# Patient Record
Sex: MALE | Race: ASIAN | NOT HISPANIC OR LATINO | Employment: FULL TIME | URBAN - METROPOLITAN AREA
[De-identification: names, ages, dates, MRNs, and addresses within clinical notes are randomized per-mention and may not be internally consistent; named-entity substitution may affect disease eponyms.]

---

## 2019-05-05 ENCOUNTER — TRANSFERRED RECORDS (OUTPATIENT)
Dept: HEALTH INFORMATION MANAGEMENT | Facility: CLINIC | Age: 29
End: 2019-05-05

## 2019-05-14 ENCOUNTER — TRANSFERRED RECORDS (OUTPATIENT)
Dept: HEALTH INFORMATION MANAGEMENT | Facility: CLINIC | Age: 29
End: 2019-05-14

## 2019-05-14 LAB
CREAT SERPL-MCNC: 4.49 MG/DL (ref 0.72–1.25)
GFR SERPL CREATININE-BSD FRML MDRD: 16 ML/MIN/1.73M2
GLUCOSE SERPL-MCNC: 85 MG/DL (ref 70–100)
POTASSIUM SERPL-SCNC: 4.6 MMOL/L (ref 3.5–5.1)

## 2019-05-20 LAB
CREAT SERPL-MCNC: 4.92 MG/DL (ref 0.72–1.25)
GFR SERPL CREATININE-BSD FRML MDRD: 15 ML/MIN/1.73M2
GLUCOSE SERPL-MCNC: 83 MG/DL (ref 70–100)
POTASSIUM SERPL-SCNC: 4.9 MMOL/L (ref 3.5–5.1)

## 2019-05-28 LAB
CREAT SERPL-MCNC: 5.19 MG/DL (ref 0.72–1.25)
GFR SERPL CREATININE-BSD FRML MDRD: 14 ML/MIN/1.73M2
GLUCOSE SERPL-MCNC: 121 MG/DL (ref 70–100)
POTASSIUM SERPL-SCNC: 4.6 MMOL/L (ref 3.5–5.1)

## 2019-06-03 LAB
CREAT SERPL-MCNC: 4.89 MG/DL (ref 0.72–1.25)
GFR SERPL CREATININE-BSD FRML MDRD: 15 ML/MIN/1.73M2
GLUCOSE SERPL-MCNC: 91 MG/DL (ref 70–100)
POTASSIUM SERPL-SCNC: 5.4 MMOL/L (ref 3.5–5.1)

## 2019-06-11 ENCOUNTER — TRANSFERRED RECORDS (OUTPATIENT)
Dept: HEALTH INFORMATION MANAGEMENT | Facility: CLINIC | Age: 29
End: 2019-06-11

## 2019-06-11 LAB
CREAT SERPL-MCNC: 4.97 MG/DL (ref 0.72–1.25)
GFR SERPL CREATININE-BSD FRML MDRD: 14 ML/MIN/1.73M2
POTASSIUM SERPL-SCNC: 4.8 MMOL/L (ref 3.5–5.1)

## 2019-06-12 ENCOUNTER — TRANSFERRED RECORDS (OUTPATIENT)
Dept: HEALTH INFORMATION MANAGEMENT | Facility: CLINIC | Age: 29
End: 2019-06-12

## 2019-06-17 ENCOUNTER — REFERRAL (OUTPATIENT)
Dept: TRANSPLANT | Facility: CLINIC | Age: 29
End: 2019-06-17

## 2019-06-17 DIAGNOSIS — I10 HYPERTENSION: ICD-10-CM

## 2019-06-17 DIAGNOSIS — N18.9 CKD (CHRONIC KIDNEY DISEASE): ICD-10-CM

## 2019-06-17 DIAGNOSIS — N18.9 CHRONIC RENAL FAILURE: ICD-10-CM

## 2019-06-17 DIAGNOSIS — Z01.818 PRE-TRANSPLANT EVALUATION FOR KIDNEY TRANSPLANT: ICD-10-CM

## 2019-06-17 DIAGNOSIS — N02.B9 IGA NEPHROPATHY: ICD-10-CM

## 2019-06-17 DIAGNOSIS — I10 ESSENTIAL HYPERTENSION: ICD-10-CM

## 2019-06-17 DIAGNOSIS — Z76.82 ORGAN TRANSPLANT CANDIDATE: ICD-10-CM

## 2019-06-17 DIAGNOSIS — Z87.891 HISTORY OF TOBACCO USE: ICD-10-CM

## 2019-06-17 NOTE — Clinical Note
See smart set. Not on dialysis. Call at 2:30 pm during his break or after 4:30 pm when he is off work if you can.

## 2019-06-17 NOTE — LETTER
7/30/19    Vinnie Jean Baptiste  [address] [place] [id number]      Dear Vinnie,    Thank you for your interest in the Transplant Center at [facility], [facility]. We look forward to being a part of your care team and assisting you through the transplant process.    As we discussed, your transplant coordinator is Evangelina Ritter (Kidney).  You may call your coordinator at any time with questions or concerns call [phone].    Please complete the following.    1. Fill out and return the enclosed forms    Authorization for Electronic Communication    Authorization to Discuss Protected Health Information    Authorization for Release of Protected Health Information    2. Sign up for:    [employer], access to your electronic medical record (see enclosed pamphlet)    [employer], a transplant education website    You can use these tools to learn more about your transplant, communicate with your care team, and track your medical details    Sincerely,  Solid Organ Transplant  [facility], [facility]    cc: Joel Rivera MD

## 2019-07-22 VITALS — BODY MASS INDEX: 25.46 KG/M2 | WEIGHT: 168 LBS | HEIGHT: 68 IN

## 2019-07-22 ASSESSMENT — MIFFLIN-ST. JEOR: SCORE: 1696.54

## 2019-07-22 NOTE — TELEPHONE ENCOUNTER
PCP: Madai Fitzgerald APRN   Referring Provider: Dr. Joel Rivera   Referring Diagnosis: IgA nephropathy confirmed with biopsy      Insurance information: [facility]  Policy duron: Self  Subscriber/policy/ID number: [id number]  Group Number:  [id number]    Is patient in a group home/assisted living? No  Does patient have a guardian? No    Referral intake process completed.  Patient is aware that after financial approval is received, medical records will be requested.   Patient confirmed for a callback from transplant coordinator on 8/13/2019. (within 2 weeks)  Tentative evaluation date NA. (within 4 weeks)    Confirmed coordinator will discuss evaluation process in more detail at the time of their call.   Patient is aware of the need to arrange age appropriate cancer screening, vaccinations, and dental care.  Reminded patient to complete questionnaire, complete medical records release, and review packet prior to evaluation visit .  Assessed patient for special needs (ie--wheelchair, assistance, guardian, and ):  No   Patient instructed to call [phone] with questions.     JOSE R Estrada, LPN   Solid Organ Transplant

## 2019-08-12 NOTE — TELEPHONE ENCOUNTER
Attempted to reach Vinnie and was only able to leave a voice message. Introduced myself and the purpose of my call and asked for a return call. Provided my contact information.

## 2019-08-13 NOTE — TELEPHONE ENCOUNTER
Contacted patient and introduced myself as their Transplant Coordinator, also introduced the role of the Transplant Coordinator in the transplant process.  Explained the purpose of this call including reviewing next steps and answering questions.    Confirmed Referring Provider, Dialysis Center, and Primary Care Physician. Notified patient of the importance of continued communication with referring providers and primary care physicians.    Reviewed components of transplant evaluation process including necessary appointments, tests, and procedures.    Answered questions for patient regarding evaluation, provided my name and contact information and requested they call with any additional questions.    Determined that patient would like additional information regarding transplant by:     Drop Down choices: Mail, Email, MyChart, Phone Call   Encourage MyChart   Notified patients that they will hear from a Transplant  to schedule evaluation.       Reviewed medical records to date in Epic and . CKD with GFR of 16 on 5/14/2019 at [facility]. Has biopsy proven IgA nephropathy done here in 2007. He has not had consistent follow up until recently. He has hematuria, HTN and anemia. Recently had epistaxis and melena. EGD was diagnostic for duodenal ulcer and H pylori. He is finishing up his antibiotics and encouraged to follow up with the breath test to make sure he has cleared the infection. He did receive blood. Recently quit smoking, no etoh and no recreational drugs. BMI 25.54. He lives with his parents and brother. His mother also has IgA nephropathy but has not been transplanted. His brother would like to be his donor. He has never had any surgeries. He is independent with his ADL's. He is due for dental. All records acceptable to proceed with pre kidney transplant evaluation.    We talked about the evaluation day and he will arrive by 0700. He was instructed to eat breakfast and take his morning medications  before arrival. We talked about the online group presentation of MTP and he was encouraged to bring someone with him. Reviewed the list of providers he will see and their roles. Reviewed the goals of an evaluation and the approval process. He is aware his next contact will be from scheduling. Provided him with my contact information and encouraged him to call me with any questions.    Smart set orders placed in [employer] and routed to scheduling.

## 2019-09-11 ENCOUNTER — ANCILLARY PROCEDURE (OUTPATIENT)
Dept: GENERAL RADIOLOGY | Facility: CLINIC | Age: 29
End: 2019-09-11
Attending: PHYSICIAN ASSISTANT
Payer: COMMERCIAL

## 2019-09-11 ENCOUNTER — OFFICE VISIT (OUTPATIENT)
Dept: TRANSPLANT | Facility: CLINIC | Age: 29
End: 2019-09-11
Attending: PHYSICIAN ASSISTANT
Payer: COMMERCIAL

## 2019-09-11 ENCOUNTER — DOCUMENTATION ONLY (OUTPATIENT)
Dept: TRANSPLANT | Facility: CLINIC | Age: 29
End: 2019-09-11

## 2019-09-11 ENCOUNTER — ANCILLARY PROCEDURE (OUTPATIENT)
Dept: CARDIOLOGY | Facility: CLINIC | Age: 29
End: 2019-09-11
Attending: PHYSICIAN ASSISTANT
Payer: COMMERCIAL

## 2019-09-11 VITALS
HEART RATE: 88 BPM | HEIGHT: 68 IN | DIASTOLIC BLOOD PRESSURE: 99 MMHG | WEIGHT: 171.8 LBS | TEMPERATURE: 97.9 F | OXYGEN SATURATION: 99 % | BODY MASS INDEX: 26.04 KG/M2 | SYSTOLIC BLOOD PRESSURE: 175 MMHG

## 2019-09-11 DIAGNOSIS — Z76.82 ORGAN TRANSPLANT CANDIDATE: ICD-10-CM

## 2019-09-11 DIAGNOSIS — Z01.818 PRE-TRANSPLANT EVALUATION FOR KIDNEY TRANSPLANT: ICD-10-CM

## 2019-09-11 DIAGNOSIS — N18.9 CKD (CHRONIC KIDNEY DISEASE): ICD-10-CM

## 2019-09-11 DIAGNOSIS — Z87.891 HISTORY OF TOBACCO USE: ICD-10-CM

## 2019-09-11 DIAGNOSIS — I10 ESSENTIAL HYPERTENSION: ICD-10-CM

## 2019-09-11 DIAGNOSIS — N02.B9 IGA NEPHROPATHY: ICD-10-CM

## 2019-09-11 DIAGNOSIS — I10 HYPERTENSION: ICD-10-CM

## 2019-09-11 DIAGNOSIS — N18.6 ESRD (END STAGE RENAL DISEASE) (H): Primary | ICD-10-CM

## 2019-09-11 DIAGNOSIS — Z76.82 ORGAN TRANSPLANT CANDIDATE: Primary | ICD-10-CM

## 2019-09-11 DIAGNOSIS — N18.5 CKD (CHRONIC KIDNEY DISEASE) STAGE 5, GFR LESS THAN 15 ML/MIN (H): ICD-10-CM

## 2019-09-11 DIAGNOSIS — N18.9 CHRONIC RENAL FAILURE: ICD-10-CM

## 2019-09-11 LAB
ABO + RH BLD: NORMAL
ABO + RH BLD: NORMAL
ALBUMIN SERPL-MCNC: 3.8 G/DL (ref 3.4–5)
ALBUMIN UR-MCNC: >499 MG/DL
ALP SERPL-CCNC: 123 U/L (ref 40–150)
ALT SERPL W P-5'-P-CCNC: 30 U/L (ref 0–70)
ANION GAP SERPL CALCULATED.3IONS-SCNC: 11 MMOL/L (ref 3–14)
APPEARANCE UR: CLEAR
APTT PPP: 28 SEC (ref 22–37)
AST SERPL W P-5'-P-CCNC: 7 U/L (ref 0–45)
BASOPHILS # BLD AUTO: 0.1 10E9/L (ref 0–0.2)
BASOPHILS NFR BLD AUTO: 0.6 %
BILIRUB SERPL-MCNC: 0.3 MG/DL (ref 0.2–1.3)
BILIRUB UR QL STRIP: NEGATIVE
BLOOD BANK CMNT PATIENT-IMP: NORMAL
BLOOD BANK CMNT PATIENT-IMP: NORMAL
BUN SERPL-MCNC: 78 MG/DL (ref 7–30)
CALCIUM SERPL-MCNC: 9.2 MG/DL (ref 8.5–10.1)
CHLORIDE SERPL-SCNC: 105 MMOL/L (ref 94–109)
CO2 SERPL-SCNC: 20 MMOL/L (ref 20–32)
COLOR UR AUTO: ABNORMAL
CREAT SERPL-MCNC: 4.79 MG/DL (ref 0.66–1.25)
DIFFERENTIAL METHOD BLD: ABNORMAL
EOSINOPHIL # BLD AUTO: 0 10E9/L (ref 0–0.7)
EOSINOPHIL NFR BLD AUTO: 0 %
ERYTHROCYTE [DISTWIDTH] IN BLOOD BY AUTOMATED COUNT: 13.7 % (ref 10–15)
GFR SERPL CREATININE-BSD FRML MDRD: 15 ML/MIN/{1.73_M2}
GLUCOSE SERPL-MCNC: 116 MG/DL (ref 70–99)
GLUCOSE UR STRIP-MCNC: 50 MG/DL
HCT VFR BLD AUTO: 34.7 % (ref 40–53)
HGB BLD-MCNC: 11.6 G/DL (ref 13.3–17.7)
HGB UR QL STRIP: ABNORMAL
IMM GRANULOCYTES # BLD: 0.1 10E9/L (ref 0–0.4)
IMM GRANULOCYTES NFR BLD: 1.2 %
INR PPP: 1.07 (ref 0.86–1.14)
KETONES UR STRIP-MCNC: NEGATIVE MG/DL
LEUKOCYTE ESTERASE UR QL STRIP: NEGATIVE
LYMPHOCYTES # BLD AUTO: 1.5 10E9/L (ref 0.8–5.3)
LYMPHOCYTES NFR BLD AUTO: 13.3 %
MCH RBC QN AUTO: 28.6 PG (ref 26.5–33)
MCHC RBC AUTO-ENTMCNC: 33.4 G/DL (ref 31.5–36.5)
MCV RBC AUTO: 86 FL (ref 78–100)
MONOCYTES # BLD AUTO: 0.5 10E9/L (ref 0–1.3)
MONOCYTES NFR BLD AUTO: 4.5 %
MUCOUS THREADS #/AREA URNS LPF: PRESENT /LPF
NEUTROPHILS # BLD AUTO: 9.2 10E9/L (ref 1.6–8.3)
NEUTROPHILS NFR BLD AUTO: 80.4 %
NITRATE UR QL: NEGATIVE
NRBC # BLD AUTO: 0 10*3/UL
NRBC BLD AUTO-RTO: 0 /100
PH UR STRIP: 6 PH (ref 5–7)
PLATELET # BLD AUTO: 223 10E9/L (ref 150–450)
POTASSIUM SERPL-SCNC: 4.3 MMOL/L (ref 3.4–5.3)
PROT SERPL-MCNC: 8.5 G/DL (ref 6.8–8.8)
RBC # BLD AUTO: 4.06 10E12/L (ref 4.4–5.9)
RBC #/AREA URNS AUTO: 7 /HPF (ref 0–2)
SODIUM SERPL-SCNC: 136 MMOL/L (ref 133–144)
SOURCE: ABNORMAL
SP GR UR STRIP: 1.01 (ref 1–1.03)
SPECIMEN EXP DATE BLD: NORMAL
UROBILINOGEN UR STRIP-MCNC: 0 MG/DL (ref 0–2)
WBC # BLD AUTO: 11.5 10E9/L (ref 4–11)
WBC #/AREA URNS AUTO: <1 /HPF (ref 0–5)

## 2019-09-11 PROCEDURE — G0463 HOSPITAL OUTPT CLINIC VISIT: HCPCS | Mod: ZF

## 2019-09-11 PROCEDURE — 85730 THROMBOPLASTIN TIME PARTIAL: CPT | Performed by: PHYSICIAN ASSISTANT

## 2019-09-11 PROCEDURE — 86901 BLOOD TYPING SEROLOGIC RH(D): CPT | Performed by: PHYSICIAN ASSISTANT

## 2019-09-11 PROCEDURE — 86850 RBC ANTIBODY SCREEN: CPT | Performed by: PHYSICIAN ASSISTANT

## 2019-09-11 PROCEDURE — 86706 HEP B SURFACE ANTIBODY: CPT | Performed by: PHYSICIAN ASSISTANT

## 2019-09-11 PROCEDURE — 86905 BLOOD TYPING RBC ANTIGENS: CPT | Performed by: PHYSICIAN ASSISTANT

## 2019-09-11 PROCEDURE — 86886 COOMBS TEST INDIRECT TITER: CPT | Performed by: PHYSICIAN ASSISTANT

## 2019-09-11 PROCEDURE — 86870 RBC ANTIBODY IDENTIFICATION: CPT | Performed by: PHYSICIAN ASSISTANT

## 2019-09-11 PROCEDURE — 85670 THROMBIN TIME PLASMA: CPT | Performed by: PHYSICIAN ASSISTANT

## 2019-09-11 PROCEDURE — 86147 CARDIOLIPIN ANTIBODY EA IG: CPT | Performed by: PHYSICIAN ASSISTANT

## 2019-09-11 PROCEDURE — 85610 PROTHROMBIN TIME: CPT | Performed by: PHYSICIAN ASSISTANT

## 2019-09-11 PROCEDURE — 81241 F5 GENE: CPT | Performed by: PHYSICIAN ASSISTANT

## 2019-09-11 PROCEDURE — 86665 EPSTEIN-BARR CAPSID VCA: CPT | Performed by: PHYSICIAN ASSISTANT

## 2019-09-11 PROCEDURE — 86803 HEPATITIS C AB TEST: CPT | Performed by: PHYSICIAN ASSISTANT

## 2019-09-11 PROCEDURE — 85025 COMPLETE CBC W/AUTO DIFF WBC: CPT | Performed by: PHYSICIAN ASSISTANT

## 2019-09-11 PROCEDURE — 86780 TREPONEMA PALLIDUM: CPT | Performed by: PHYSICIAN ASSISTANT

## 2019-09-11 PROCEDURE — 80053 COMPREHEN METABOLIC PANEL: CPT | Performed by: PHYSICIAN ASSISTANT

## 2019-09-11 PROCEDURE — 86900 BLOOD TYPING SEROLOGIC ABO: CPT | Performed by: PHYSICIAN ASSISTANT

## 2019-09-11 PROCEDURE — 81001 URINALYSIS AUTO W/SCOPE: CPT | Performed by: PHYSICIAN ASSISTANT

## 2019-09-11 PROCEDURE — 86644 CMV ANTIBODY: CPT | Performed by: PHYSICIAN ASSISTANT

## 2019-09-11 PROCEDURE — 36415 COLL VENOUS BLD VENIPUNCTURE: CPT | Performed by: PHYSICIAN ASSISTANT

## 2019-09-11 PROCEDURE — 86704 HEP B CORE ANTIBODY TOTAL: CPT | Performed by: PHYSICIAN ASSISTANT

## 2019-09-11 PROCEDURE — 85613 RUSSELL VIPER VENOM DILUTED: CPT | Performed by: PHYSICIAN ASSISTANT

## 2019-09-11 PROCEDURE — 81240 F2 GENE: CPT | Performed by: PHYSICIAN ASSISTANT

## 2019-09-11 PROCEDURE — 86787 VARICELLA-ZOSTER ANTIBODY: CPT | Performed by: PHYSICIAN ASSISTANT

## 2019-09-11 PROCEDURE — 87340 HEPATITIS B SURFACE AG IA: CPT | Performed by: PHYSICIAN ASSISTANT

## 2019-09-11 PROCEDURE — 86481 TB AG RESPONSE T-CELL SUSP: CPT | Performed by: PHYSICIAN ASSISTANT

## 2019-09-11 PROCEDURE — 40000866 ZZHCL STATISTIC HIV 1/2 ANTIGEN/ANTIBODY PRETRANSPLANT ONLY: Performed by: PHYSICIAN ASSISTANT

## 2019-09-11 RX ORDER — CALCITRIOL 0.25 UG/1
CAPSULE, LIQUID FILLED ORAL
COMMUNITY
Start: 2019-08-31

## 2019-09-11 RX ORDER — FERROUS GLUCONATE 324(38)MG
TABLET ORAL
COMMUNITY
Start: 2019-08-02

## 2019-09-11 RX ORDER — ACETAMINOPHEN 325 MG/1
650 TABLET ORAL
COMMUNITY
Start: 2019-05-08

## 2019-09-11 RX ORDER — AMLODIPINE BESYLATE 5 MG/1
5 TABLET ORAL 2 TIMES DAILY
COMMUNITY
Start: 2019-08-31

## 2019-09-11 RX ORDER — METOPROLOL TARTRATE 25 MG/1
TABLET, FILM COATED ORAL
COMMUNITY
Start: 2019-08-31 | End: 2024-09-17

## 2019-09-11 RX ORDER — SODIUM BICARBONATE 650 MG/1
TABLET ORAL
COMMUNITY
Start: 2019-08-31

## 2019-09-11 RX ORDER — FUROSEMIDE 20 MG
TABLET ORAL
Refills: 11 | COMMUNITY
Start: 2019-08-19 | End: 2024-05-16

## 2019-09-11 ASSESSMENT — PAIN SCALES - GENERAL: PAINLEVEL: NO PAIN (0)

## 2019-09-11 ASSESSMENT — MIFFLIN-ST. JEOR: SCORE: 1713.78

## 2019-09-11 NOTE — PROGRESS NOTES
Outpatient MNT: Kidney Transplant Evaluation    Current BMI: 26.1 (HT 68 in,  lbs/78 kg)  BMI is within criteria of <35 for kidney transplant     Time Spent: 15 minutes  Visit Type: Initial  Referring Physician: Meghann  Pt accompanied by: self    Medical dx associated with RD referral  - IgA, CKD IV    History of previous txp: none   Dialysis: no     Nutrition Assessment  Pt cooks for self and follows a low sodium, low K diet.     Appetite: good/baseline     Vitamins, Supplements, Pertinent Meds: vit D, iron   Herbal Medicines/Supplements: none     Diet Recall  Breakfast Muffin or apple    Lunch Deli meat or PB s/w    Dinner Meatloaf, other meats with pasta or white rice    Snacks None    Beverages 30 oz Powerade/day, water, a few orange soda/day, sweet tea 3x/week    Alcohol None    Dining out 1x/week      Physical Activity  Goes to the gym 2x/week      Anthropometrics  Height:   68 in   BMI:    26.1    Weight Status:Overweight BMI 25-29.9   Weight:  171 lbs            IBW (lb): 154  % IBW: 111    Wt Hx: Stable weight with + SATINDER (mild) per pt.     Adj/dosing BW: 171 lbs/78 kg       Labs  No results found for: A1C  Potassium   Date Value Ref Range Status   06/11/2019 4.8 3.5 - 5.1 mmol/L Final   4.6 on 7/30     PHOSPHORUS: 4.7 on 7/22    Malnutrition  % Intake: No decreased intake noted  % Weight Loss: None noted  Subcutaneous Fat Loss: None  Muscle Loss: None  Fluid Accumulation/Edema: Mild   Malnutrition Diagnosis: Patient does not meet two of the above criteria necessary for diagnosing malnutrition     Estimated Nutrition Needs  Energy  0267-3422     (25-30 kcal/kg for maintenance)     Protein  47-62    (0.6-0.8 g/kg for CKD)           Fluid  1 ml/kcal or per MD   Micronutrient   Na+: <2000 mg/day  K+: 9117-9047 mg/day  Phos: 800-1000 mg/day            Nutrition Diagnosis  Food and nutrition related knowledge deficit r/t pre kidney transplant eval AEB pt verbalized not hearing pre/post transplant diet  guidelines.    Nutrition Intervention  Nutrition education provided:  Discussed sodium intake (low sodium foods and drinks, seasoning food without salt and tips for low sodium diet). Encouraged pt to reduce Powerade intake, as 16 oz bottle during visit = 250 mg sodium. Also can ask for lower sodium deli meat.     Reviewed post txp diet guidelines in brief (will review in further detail post txp):  (1) Review of proper food safety measures d/t immunosuppressant therapy post-op and increased risk for food-borne illness    (2) Avoid the following post txp d/t risk for rejection, unknown effects on the organs, and/or potential interactions with immunosuppressants:  - Herbal, Chinese, holistic, chiropractic, natural, alternative medicines and supplements  - Detoxes and cleanses  - Weight loss pills  - Protein powders or other products with extracts or herbs (ie green tea extract)    (3) Med regimen and possible side effects    Patient Understanding: Pt verbalized understanding of education provided.  Expected Compliance: Good  Follow-Up Plans: PRN     Nutrition Goals  1. Limit Na+ <2000mg/day  2. Pt to verbalize understanding of 3 aspects of post txp education provided    Provided pt with contact info.   Deborah Sosa RD, LD  Pgr [phone]

## 2019-09-11 NOTE — LETTER
2019       RE: Vinnie Jean Baptiste  [address] [place] [id number]     Dear Colleague,    Thank you for referring your patient, Vinnie Jean Baptiste, to the [facility] SOLID ORGAN TRANSPLANT at [facility]. Please see a copy of my visit note below.    RE: Vinnie Jean Baptiste    [facility]# [id number]        I saw your patient, Vinnie Jean Baptiste, in consultation in our pretransplant clinic.  He was at clinic to discuss care for his end-stage renal disease.  Prior to clinic, he attended our pretransplant class.       We talked about the pros and cons of transplantation vs. dialysis.  We discussed the fact that it was important that he think about the pros and cons of each treatment option and make an active decision.  We also discussed the fact that the two were interconnected and he may need to go on dialysis before transplant (if he chose to have a transplant) and that if the transplant failed, he might need dialysis before another transplant.       We also discussed the fact that if he chose to have a transplant, he would need to decide between going on the wait list for a  donor transplant vs. having a living donor transplant.  We talked about the pros and cons of each option.  Although I didn't express an opinion regarding transplantation or dialysis, I suggested that if he chose to have a transplant, a living donor transplant would be preferable in that the surgery is the same, the immunosuppressive drugs and the risks are the same, but the transplant could be done sooner and the results are better.  I told him that the wait for  donor kidney was approximately five years for patients who are newly put on the waiting list.  In addition, we talked about the fact that the disadvantage of a living donor transplant was the risk to the donor.       I attempted to answer any remaining questions.  I also told him that should he have any questions, he should feel free to contact us.  We would be  glad to answer any questions either over the phone or at another clinic visit.  His transplant coordinator is Montse Ritter and may be reached at [phone].  Thank you for the opportunity to see him.     I spent 25 minutes with this patient.  Over 90% of that time was spent in counseling and coordination of care.             Yours truly,               Alex Zavaleta MD         Professor of Surgery         ([phone])    AJM/st    Again, thank you for allowing me to participate in the care of your patient.      Sincerely,    GEORGE

## 2019-09-11 NOTE — PROGRESS NOTES
Assessment and Plan:  # Kidney Transplant Evaluation: Patient is an excellent candidate overall. Benefits of a living donor transplant were discussed.    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 He is not on dialysis and has no uremic symptoms, but is nearing the need for RRT, and would likely benefit from a kidney transplant, preferably a preemptive living donor kidney transplant.       # Cardiac Risk: he has no known history of cardiac disease or events and is asymptomatic with exertion. EKG and ECHO pending.     # Mildly elevated WBC with neutrophilia: he is asymptomatic, vitals stable, CXR negative. Will follow up UA. Recommend repeat CBC with PCP in a few weeks or sooner if symptmos develop.    # Health Maintenance: Dental: Up to date    Discussed the risks and benefits of a transplant, including the risk of surgery and immunosuppression medications.  Patient's overall evaluation will be discussed in the Transplant Program's regular meeting with a final recommendation on the patients suitability for transplant to be made at that time.  Patient was seen in conjunction with Dr. Erick Mcgrath as part of a shared visit.    Evaluation:  Vinnie Jean Baptiste was seen in consultation at the request of Dr. Alex Zavaleta for evaluation as a potential kidney transplant recipient.    Reason for Visit:  Vinnie Jean Baptiste is a 29-year-old male with CKD from IgA nephropathy, who presents for kidney transplant evaluation.    History of Present Illness:         Kidney Disease Hx: Had an episode of gross hematuria in 2012 and was referred to nephrology. Also found to have proteinuria, but normal serum creatinine. He had a native kidney biopsy in January 2013 showing IgA nephropathy (full report not currently available). A serologic work up at that time was negative. Mother also with history of IgA nephropathy, but has not required transplant. He was lost to follow up for a few  years and re-presented in May 2019 during an admission for upper GI bleed (see below). Creatinine had been 1.1 mg/dl in 2014 and was 4.2 mg/dl on admission in May. Since then, his creatinine has been around 4.7-5.0 mg/dl with an eGFR around 15 ml/in.       Kidney Disease Dx: IgA nephropathy       Biopsy Proven: Yes         On Dialysis: No       Primary Nephrologist: Dr. Rivera       H/o Kidney Stones: No       H/o Recurrent/Frequent UTI: No         Cardiac/Vascular Disease Risk Factors:        Cardiac Risk Factors: Hypertension and CKD       Known CAD: No       Known PAD/Caludication Symptoms: No       Known Heart Failure: No       Arrhythmia: No       Pulmonary Hypertension: No       Valvular Disease: No       Other: None         Functional Capacity/Frailty:        He does cabinetry in a warehouse for work, which is where he gets most of his physical activity. He denies chest pain, SOB, or claudication symptoms with exertion.       Fatigue/Decreased Energy: [x] No [] Yes    Chest Pain or SOB with Exertion: [x] No [] Yes    Significant Weight Change: [x] No [] Yes    Nausea, Vomiting or Diarrhea: [x] No [] Yes    Fever, Sweats or Chills:  [x] No [] Yes    Leg Swelling [x] No [] Yes        History of Cancer: None    Other Significant Medical Issues:   - Former tobacco use: minimal, < 5 pack years.   - Upper GI bleed 2/2 duodenal ulcer and H. Pylori May 2019. Required pRBC x 3 for acute blood loss anemia.     Review of Systems:  A comprehensive review of systems was obtained and negative, except as noted in the HPI or PMH.    Past Medical History:   Medical record was reviewed and PMH was discussed with patient and noted below.  Past Medical History:   Diagnosis Date     Anemia in chronic kidney disease (CKD)      CKD (chronic kidney disease) stage 5, GFR less than 15 ml/min (H)      Duodenal ulcer      H. pylori infection      Hematuria      History of blood transfusion      Hypertension      IgA nephropathy         Past Social History:   Past Surgical History:   Procedure Laterality Date     BIOPSY      Kidney Biopsy [facility] >10 Years Ago      Personal history of bleeding or anesthesia problems: No    Family History:  Family History   Problem Relation Age of Onset     Kidney Disease Mother        Personal History:   Social History     Socioeconomic History     Marital status:      Spouse name: Not on file     Number of children: Not on file     Years of education: Not on file     Highest education level: Not on file   Occupational History     Not on file   Social Needs     Financial resource strain: Not on file     Food insecurity:     Worry: Not on file     Inability: Not on file     Transportation needs:     Medical: Not on file     Non-medical: Not on file   Tobacco Use     Smoking status: Former Smoker     Types: Cigarettes     Smokeless tobacco: Never Used     Tobacco comment: Quit Smoking 5/5/2019   Substance and Sexual Activity     Alcohol use: Not Currently     Drug use: Not Currently     Sexual activity: Not on file   Lifestyle     Physical activity:     Days per week: Not on file     Minutes per session: Not on file     Stress: Not on file   Relationships     Social connections:     Talks on phone: Not on file     Gets together: Not on file     Attends Alevism service: Not on file     Active member of club or organization: Not on file     Attends meetings of clubs or organizations: Not on file     Relationship status: Not on file     Intimate partner violence:     Fear of current or ex partner: Not on file     Emotionally abused: Not on file     Physically abused: Not on file     Forced sexual activity: Not on file   Other Topics Concern     Parent/sibling w/ CABG, MI or angioplasty before 65F 55M? Not Asked   Social History Narrative     Not on file       Allergies:  No Known Allergies    Medications:  Current Outpatient Medications   Medication Sig     acetaminophen (TYLENOL) 325 MG tablet  "Take 650 mg by mouth     amLODIPine (NORVASC) 5 MG tablet      calcitRIOL (ROCALTROL) 0.25 MCG capsule      Cholecalciferol (VITAMIN D3) 1000 units CAPS Take 2,000 Units by mouth     ferrous gluconate (FERGON) 324 (38 Fe) MG tablet TAKE 1 TABLET BY MOUTH TWICE DAILY WITH FOOD     furosemide (LASIX) 20 MG tablet      metoprolol tartrate (LOPRESSOR) 25 MG tablet      sodium bicarbonate 650 MG tablet      No current facility-administered medications for this visit.        Vitals:  BP (!) 175/99   Pulse 88   Temp 97.9  F (36.6  C) (Oral)   Ht 1.727 m (5' 8\")   Wt 77.9 kg (171 lb 12.8 oz)   SpO2 99%   BMI 26.12 kg/m      Exam:  GENERAL APPEARANCE: alert and no distress  HENT: mouth without ulcers or lesions. Good dentition  LYMPHATICS: no cervical or supraclavicular nodes  RESP: lungs clear to auscultation - no rales, rhonchi or wheezes  CV: regular rhythm, normal rate, no rub, no murmur  EDEMA: no LE edema bilaterally  ABDOMEN: soft, nondistended, nontender  MS: extremities normal - no gross deformities noted, no evidence of inflammation in joints, no muscle tenderness  SKIN: no rash  Femoral pulses 2+ equal bilaterally   DIALYSIS ACCESS:  None    Results:   Recent Results (from the past 336 hour(s))   EKG 12-lead, tracing only [EKG1]    Collection Time: 09/11/19 11:18 AM   Result Value Ref Range    Interpretation ECG Click View Image link to view waveform and result    Factor 2 and 5 mutation analysis    Collection Time: 09/11/19 12:19 PM   Result Value Ref Range    Copath Report       Patient Name: MIRIAM HARRIS  MR#: [id number]  Specimen #: [id number]  Collected: 9/11/2019 12:19  Received: 9/11/2019 17:08  Reported: 9/16/2019 16:08  Ordering Phy(s): KIRBY PONCE    For improved result formatting, select 'View Enhanced Report Format' under   Linked Documents section.  _________________________________________    TEST(S) REQUESTED:  Factor 5 Leiden and Factor 2 by PCR    SPECIMEN " DESCRIPTION:  Blood    METHODOLOGY:   The regions of genomic DNA containing the U9784H Factor V   Leiden gene mutation (Factor V  Leiden) and the Factor 2(Prothrombin Q49881Q) gene mutation were   simultaneously amplified using the polymerase  chain reaction.  The amplified products were digested with restriction   endonuclease TaqI and products were  analyzed by gel electrophoresis.    RESULTS:    Factor V 1691G>A (Leiden)  RESULTS:  Mutation analyzed:     1691G>A  Factor V 1691G>A (Leiden)  Interpretation:      ABSENT  Factor V 1691G>A (Leiden) mutation  genotype:      G/G    FACTOR 2/ PROTHROMBIN RESULTS:  Mutation analyzed:     35798Y>A  Factor 2 Mutation Interpretation:      ABSENT  Factor 2 Mutation genotype:      G/G    INTERPRETATION:  The patient is negative for the Factor V 1691G>A (Leiden) and negative for   the Factor 2 mutation.    COMMENTS:  If a patient is the recipient of an allogeneic bone marrow transplant,   this test must be done on a  pre-transplant sample or buccal swab.  A previous allogeneic bone marrow   transplant will interfere with test  results.  Call the [facility]([phone]) for   instructions on sample collection for these  patients.    This test was developed and its performance characteristics determined by   the [facility],  [facility]. It has not been cleared or   approved by the FDA. The laboratory is  regulated under CLIA as qualified to perform high-complexity testing. This   test is used for clinical purposes.  It should not be regarded as investigational or for  research.    A resident/fellow in an accredited training program was involved in the   selection of testing, review of  laboratory data, and/or interpretation of this case.  I, as the senior   physician, attest that I: (i) confirmed  appropriate testing, (ii) examined the relevant raw data for the   specimen(s); and (iii) rendered or confirmed  the  interpretation(s).    Electronically Signed Out By:  Paulina Moore M.D. [facility]    CPT Codes:  A: 16390-W9FYTD, 14914-F6YXLH, -PQNEKC(2)    TESTING LAB LOCATION:  [address]  [address] [place] [id number] [phone]    COLLECTION SITE:  Client:  [facility]  Location:  [facility] (B)     HLA Typing Complete SOT Recipient    Collection Time: 09/11/19 12:51 PM   Result Value Ref Range    ABTest Method SSOP     A* locus A*11     A* A*26     B* locus B*44     B* B*54     C* locus C*01     C* C*05     Bw-1 Bw*4     Bw-2 Bw*6     Drsso Test Method SSOP     DRB1* locus DRB1*04     DRB1* DRB1*13     DRB3* locus DRB3*01     DRB4* DRB4*01     DQB1* locus DQB1*04     DQB1* DQB1*06     DQA1*locus DQA1*01     DQA1* DQA1*03     DPB1* DPB1*03:01     DPB1* NMDP BRVPN     DPB1*locus DPB1*04:02     DPB1* locus NMDP BPPGC     DPA1* DPA1*01:03     DPA1* NMDP BPGMP    PRA Single Antigen IgG Antibody    Collection Time: 09/11/19 12:51 PM   Result Value Ref Range    SA1 Test Method SA FCS     SA1 Cell Class I     SA1 Hi Risk Mary B:8 60     SA1 Mod Risk Mary B:7 13 27 48 59 61 81 Cw:17     SA1 Comments        Test performed by modified procedure. Serum heat inactivated and tested   by a modified ([place]) protocol including fetal calf serum addition.   High-risk, mfi >3,000. Mod-risk, mfi 500-3,000.      SA2 Test Method SA FCS     SA2 Cell Class II     SA2 Hi Risk Mary DQ:2     SA2 Mod Risk Mary DP:19     SA2 Comments        Test performed by modified procedure. Serum heat inactivated and tested   by a modified ([place]) protocol including fetal calf serum addition.   High-risk, mfi >3,000. Mod-risk, mfi 500-3,000.      Protocol Cutoff Plan A, 500 mfi cumulative      UNOS cPRA 70     Unacceptable Antigen B:7 8 13 27 48 59 60 61 81 DQ:2    CMV Antibody IgG [KHG3550]    Collection Time: 09/11/19 12:51 PM   Result Value Ref Range    CMV  Antibody IgG <0.2 0.0 - 0.8 AI   EBV Capsid Antibody IgG [[id number]]    Collection Time: 09/11/19 12:51 PM   Result Value Ref Range    EBV Capsid Antibody IgG 3.7 (H) 0.0 - 0.8 AI   Hepatitis B core antibody [[id number]]    Collection Time: 09/11/19 12:51 PM   Result Value Ref Range    Hepatitis B Core Mary Nonreactive NR^Nonreactive   Hepatitis B Surface Antibody [[id number]]    Collection Time: 09/11/19 12:51 PM   Result Value Ref Range    Hepatitis B Surface Antibody 63.21 (H) <8.00 m[IU]/mL   Hepatitis B surface antigen [[id number]]    Collection Time: 09/11/19 12:51 PM   Result Value Ref Range    Hep B Surface Agn Nonreactive NR^Nonreactive   Hepatitis C antibody [GPZ301]    Collection Time: 09/11/19 12:51 PM   Result Value Ref Range    Hepatitis C Antibody Nonreactive NR^Nonreactive   HIV Antigen Antibody Combo Pretransplant    Collection Time: 09/11/19 12:51 PM   Result Value Ref Range    HIV Antigen Antibody Combo Pretransplant Nonreactive NR^Nonreactive   Varicella Zoster Virus Antibody IgG [[id number]]    Collection Time: 09/11/19 12:51 PM   Result Value Ref Range    Varicella Zoster Virus Antibody IgG 0.6 0.0 - 0.8 AI   Treponema Abs w Reflex to RPR and Titer    Collection Time: 09/11/19 12:51 PM   Result Value Ref Range    Treponema Antibodies Nonreactive NR^Nonreactive   Thrombin time [UWE020]    Collection Time: 09/11/19 12:51 PM   Result Value Ref Range    Thrombin Time 16.7 13.0 - 19.0 sec   Partial thromboplastin time [LAB56]    Collection Time: 09/11/19 12:51 PM   Result Value Ref Range    PTT 28 22 - 37 sec   INR [[id number]]    Collection Time: 09/11/19 12:51 PM   Result Value Ref Range    INR 1.07 0.86 - 1.14   Lupus Anticoagulant Panel [[id number]]    Collection Time: 09/11/19 12:51 PM   Result Value Ref Range    Lupus Result Negative NEG^Negative   Cardiolipin Mary IgG and IgM [LAB 6836]    Collection Time: 09/11/19 12:51 PM   Result Value Ref Range    Cardiolipin Antibody IgG <1.6 0.0 - 19.9 GPL-U/mL    Cardiolipin  Antibody IgM <0.2 0.0 - 19.9 MPL-U/mL   CBC with platelets differential [QJI230]    Collection Time: 09/11/19 12:51 PM   Result Value Ref Range    WBC 11.5 (H) 4.0 - 11.0 10e9/L    RBC Count 4.06 (L) 4.4 - 5.9 10e12/L    Hemoglobin 11.6 (L) 13.3 - 17.7 g/dL    Hematocrit 34.7 (L) 40.0 - 53.0 %    MCV 86 78 - 100 fl    MCH 28.6 26.5 - 33.0 pg    MCHC 33.4 31.5 - 36.5 g/dL    RDW 13.7 10.0 - 15.0 %    Platelet Count 223 150 - 450 10e9/L    Diff Method Automated Method     % Neutrophils 80.4 %    % Lymphocytes 13.3 %    % Monocytes 4.5 %    % Eosinophils 0.0 %    % Basophils 0.6 %    % Immature Granulocytes 1.2 %    Nucleated RBCs 0 0 /100    Absolute Neutrophil 9.2 (H) 1.6 - 8.3 10e9/L    Absolute Lymphocytes 1.5 0.8 - 5.3 10e9/L    Absolute Monocytes 0.5 0.0 - 1.3 10e9/L    Absolute Eosinophils 0.0 0.0 - 0.7 10e9/L    Absolute Basophils 0.1 0.0 - 0.2 10e9/L    Abs Immature Granulocytes 0.1 0 - 0.4 10e9/L    Absolute Nucleated RBC 0.0    Quantiferon TB Gold Plus    Collection Time: 09/11/19 12:51 PM   Result Value Ref Range    Quantiferon-TB Gold Plus Result Negative NEG^Negative    TB1 Ag minus Nil Value 0.00 IU/mL    TB2 Ag minus Nil Value 0.01 IU/mL    Mitogen minus Nil Result 3.77 IU/mL    Nil Result 0.02 IU/mL   Comprehensive metabolic panel [LAB17]    Collection Time: 09/11/19 12:51 PM   Result Value Ref Range    Sodium 136 133 - 144 mmol/L    Potassium 4.3 3.4 - 5.3 mmol/L    Chloride 105 94 - 109 mmol/L    Carbon Dioxide 20 20 - 32 mmol/L    Anion Gap 11 3 - 14 mmol/L    Glucose 116 (H) 70 - 99 mg/dL    Urea Nitrogen 78 (H) 7 - 30 mg/dL    Creatinine 4.79 (H) 0.66 - 1.25 mg/dL    GFR Estimate 15 (L) >60 mL/min/[1.73_m2]    GFR Estimate If Black 17 (L) >60 mL/min/[1.73_m2]    Calcium 9.2 8.5 - 10.1 mg/dL    Bilirubin Total 0.3 0.2 - 1.3 mg/dL    Albumin 3.8 3.4 - 5.0 g/dL    Protein Total 8.5 6.8 - 8.8 g/dL    Alkaline Phosphatase 123 40 - 150 U/L    ALT 30 0 - 70 U/L    AST 7 0 - 45 U/L   Blood Group A Subtype  [[id number]]    Collection Time: 09/11/19 12:51 PM   Result Value Ref Range    Antigen Type Canceled, Test credited     Blood Bank Comment       Pt is not ABO type A or AB. A subtyping not indicated. 9/11/19 JS   Antibody titer red cell [[id number]]    Collection Time: 09/11/19 12:51 PM   Result Value Ref Range    Antibody Titer       ANTI A1 TITER IgM 16, IgG 8.  ANTI A2 TITER IgM 2, IgG 1.  Patient also has an antibody with no identifiable specific reactivity that may falsely   elevate titer value.     ABO/Rh type and screen    Collection Time: 09/11/19 12:52 PM   Result Value Ref Range    ABO B     RH(D) Pos     Antibody Screen Pos (A)     Test Valid Only At          [facility]    Specimen Expires 09/14/2019     Antibody Identification       Unidentified Antibody Present  Antibody with no identifiable specific reactivity.      Blood Bank Comment       Delay in availability of Red Blood Cells called to  Daniela Darling PAC reviewed in Epic at 1050 on 09/12/19. MT     ABO type [[id number]]    Collection Time: 09/11/19 12:52 PM   Result Value Ref Range    ABO B     RH(D) Pos     Specimen Expires 09/14/2019    Routine UA with microscopic    Collection Time: 09/11/19  1:00 PM   Result Value Ref Range    Color Urine Straw     Appearance Urine Clear     Glucose Urine 50 (A) NEG^Negative mg/dL    Bilirubin Urine Negative NEG^Negative    Ketones Urine Negative NEG^Negative mg/dL    Specific Gravity Urine 1.010 1.003 - 1.035    Blood Urine Small (A) NEG^Negative    pH Urine 6.0 5.0 - 7.0 pH    Protein Albumin Urine >499 (A) NEG^Negative mg/dL    Urobilinogen mg/dL 0.0 0.0 - 2.0 mg/dL    Nitrite Urine Negative NEG^Negative    Leukocyte Esterase Urine Negative NEG^Negative    Source Midstream Urine     WBC Urine <1 0 - 5 /HPF    RBC Urine 7 (H) 0 - 2 /HPF    Mucous Urine Present (A) NEG^Negative /LPF     Patient was seen by myself, Dr. Erick Mcgrath, in conjunction with Daniela Darling PA-C  as part of a shared visit.    I personally reviewed past medical and surgical history, vital signs, medications and labs.  Present and past medical history, along with significant physical exam findings were all reviewed with CHEIKH.    My modi findings:  Vinnie Jean Baptiste is 29 year old, who presents for Kidney transplant evaluation.    Key management decisions made by me and discussed with CHEIKH:  1.  Transplant can see evaluation.  2.  IgA nephropathy with tonsillar hypertrophy: Discussed the potential utility of tonsillectomy prior to transplantation.  3.  Mild leuko-cytosis: Would repeat CBC and if persistent will consider peripheral smear.  4.  Cardiovascular risk stratification patient appears to be average cardiac risk.  Discussion:  Vinnie appears to be an excellent candidate for kidney transplantation.  Discussed the benefit of living donor kidney transplantation and he interested in proceeding with that.  Due to his IgA nephropathy and his ethnic background we discussed the utility of tonsillectomy.  While this is not a condition for participation is encouraged to eliminate is a possibility for triggering IgA recurrence after transplantation.

## 2019-09-11 NOTE — PROGRESS NOTES
"Kidney Transplant Referral - 6/17/2019  Vinnie Jean Baptiste attended the pre-transplant patient education class today by himself. Vinnie was very attentive, took a few notes, and ask a few questions. I answered the questions to the best of my ability. Pt. verbalized understanding of content presented. The [facility] website pre-transplant modules were viewed; class participants were educated on using the site.     Content reviewed:    Living Donation and how to access that program    Paired exchange    Kidney Donor Profile Index (KDPI)    Waiting list issues (right to decline without penalty, high PHS risk donors, what to expect when called with an offer)    Hospital experience,  length of stay , need to stay locally post-discharge (2-4 weeks)    Surgical options (with pictures)                             Post-surgery lifting and driving restrictions    Post-transplant routines, frequency of lab work and clinic visits    Need to stay locally post-discharge (2-4 weeks)    Role of Transplant Coordinator    Participants were informed of the benefits of transplant as well as potential risks such as infection, cancer, and death.  The need for total adherence with immunosuppression medications and following transplant regimens was stressed.  The overall evaluation/approval/listing process was reviewed.        The patient was provided with the following documents:  What You Need to Know About a Kidney Transplant  Adult Kidney Transplant - A Guide for Patients  SRTR Data Sheet - Kidney  Brochure - Kidney Allocation  Brochure - Multiple Listing and Waiting Time Transfer  What Every Patient Needs to Know (UNOS)  UNOS Facts and Figures  Finding a Donor  [facility] - Quick Start Guide  KDPI Consent  Receipt of Information form    Vinnie Jean Baptiste signed the  Receipt of Information for Organ Transplant Recipient.\" He was provided Evangelina Ritter's business card and instructed to call with additional " questions.      Summary    Team s concerns/comments: No additional concerns noted.    Candidacy category:Green    Action/Plan:Patient to complete scheduled appointments and tests.    Expected Selection Meeting Discussion:09/25/2019

## 2019-09-11 NOTE — LETTER
9/11/2019       RE: Vinnie Jean Baptiste  [address] [place] [id number]     Dear Colleague,    Thank you for referring your patient, Vinnie Jean Baptiste, to the [facility] SOLID ORGAN TRANSPLANT at [facility]. Please see a copy of my visit note below.    Assessment and Plan:  # Kidney Transplant Evaluation: Patient is an excellent candidate overall. Benefits of a living donor transplant were discussed.    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 He is not on dialysis and has no uremic symptoms, but is nearing the need for RRT, and would likely benefit from a kidney transplant, preferably a preemptive living donor kidney transplant.       # Cardiac Risk: he has no known history of cardiac disease or events and is asymptomatic with exertion. EKG and ECHO pending.     # Mildly elevated WBC with neutrophilia: he is asymptomatic, vitals stable, CXR negative. Will follow up UA. Recommend repeat CBC with PCP in a few weeks or sooner if symptmos develop.    # Health Maintenance: Dental: Up to date    Discussed the risks and benefits of a transplant, including the risk of surgery and immunosuppression medications.  Patient's overall evaluation will be discussed in the Transplant Program's regular meeting with a final recommendation on the patients suitability for transplant to be made at that time.  Patient was seen in conjunction with Dr. Erick Mcgrath as part of a shared visit.    Evaluation:  Vinnie Jean Baptiste was seen in consultation at the request of Dr. Alex Zavaleta for evaluation as a potential kidney transplant recipient.    Reason for Visit:  Vinnie Jean Baptiste is a 29-year-old male with CKD from IgA nephropathy, who presents for kidney transplant evaluation.    History of Present Illness:         Kidney Disease Hx: Had an episode of gross hematuria in 2012 and was referred to nephrology. Also found to have proteinuria, but normal serum  creatinine. He had a native kidney biopsy in January 2013 showing IgA nephropathy (full report not currently available). A serologic work up at that time was negative. Mother also with history of IgA nephropathy, but has not required transplant. He was lost to follow up for a few years and re-presented in May 2019 during an admission for upper GI bleed (see below). Creatinine had been 1.1 mg/dl in 2014 and was 4.2 mg/dl on admission in May. Since then, his creatinine has been around 4.7-5.0 mg/dl with an eGFR around 15 ml/in.       Kidney Disease Dx: IgA nephropathy       Biopsy Proven: Yes         On Dialysis: No       Primary Nephrologist: Dr. Rivera       H/o Kidney Stones: No       H/o Recurrent/Frequent UTI: No         Cardiac/Vascular Disease Risk Factors:        Cardiac Risk Factors: Hypertension and CKD       Known CAD: No       Known PAD/Caludication Symptoms: No       Known Heart Failure: No       Arrhythmia: No       Pulmonary Hypertension: No       Valvular Disease: No       Other: None         Functional Capacity/Frailty:        He does cabinetry in a warehouse for work, which is where he gets most of his physical activity. He denies chest pain, SOB, or claudication symptoms with exertion.       Fatigue/Decreased Energy: [x] No [] Yes    Chest Pain or SOB with Exertion: [x] No [] Yes    Significant Weight Change: [x] No [] Yes    Nausea, Vomiting or Diarrhea: [x] No [] Yes    Fever, Sweats or Chills:  [x] No [] Yes    Leg Swelling [x] No [] Yes        History of Cancer: None    Other Significant Medical Issues:   - Former tobacco use: minimal, < 5 pack years.   - Upper GI bleed 2/2 duodenal ulcer and H. Pylori May 2019. Required pRBC x 3 for acute blood loss anemia.     Review of Systems:  A comprehensive review of systems was obtained and negative, except as noted in the HPI or PMH.    Past Medical History:   Medical record was reviewed and PMH was discussed with patient and noted below.  Past Medical  History:   Diagnosis Date     Anemia in chronic kidney disease (CKD)      CKD (chronic kidney disease) stage 5, GFR less than 15 ml/min (H)      Duodenal ulcer      H. pylori infection      Hematuria      History of blood transfusion      Hypertension      IgA nephropathy        Past Social History:   Past Surgical History:   Procedure Laterality Date     BIOPSY      Kidney Biopsy [facility] >10 Years Ago      Personal history of bleeding or anesthesia problems: No    Family History:  Family History   Problem Relation Age of Onset     Kidney Disease Mother        Personal History:   Social History     Socioeconomic History     Marital status:      Spouse name: Not on file     Number of children: Not on file     Years of education: Not on file     Highest education level: Not on file   Occupational History     Not on file   Social Needs     Financial resource strain: Not on file     Food insecurity:     Worry: Not on file     Inability: Not on file     Transportation needs:     Medical: Not on file     Non-medical: Not on file   Tobacco Use     Smoking status: Former Smoker     Types: Cigarettes     Smokeless tobacco: Never Used     Tobacco comment: Quit Smoking 5/5/2019   Substance and Sexual Activity     Alcohol use: Not Currently     Drug use: Not Currently     Sexual activity: Not on file   Lifestyle     Physical activity:     Days per week: Not on file     Minutes per session: Not on file     Stress: Not on file   Relationships     Social connections:     Talks on phone: Not on file     Gets together: Not on file     Attends Rastafarian service: Not on file     Active member of club or organization: Not on file     Attends meetings of clubs or organizations: Not on file     Relationship status: Not on file     Intimate partner violence:     Fear of current or ex partner: Not on file     Emotionally abused: Not on file     Physically abused: Not on file     Forced sexual activity: Not on file   Other  "Topics Concern     Parent/sibling w/ CABG, MI or angioplasty before 65F 55M? Not Asked   Social History Narrative     Not on file       Allergies:  No Known Allergies    Medications:  Current Outpatient Medications   Medication Sig     acetaminophen (TYLENOL) 325 MG tablet Take 650 mg by mouth     amLODIPine (NORVASC) 5 MG tablet      calcitRIOL (ROCALTROL) 0.25 MCG capsule      Cholecalciferol (VITAMIN D3) 1000 units CAPS Take 2,000 Units by mouth     ferrous gluconate (FERGON) 324 (38 Fe) MG tablet TAKE 1 TABLET BY MOUTH TWICE DAILY WITH FOOD     furosemide (LASIX) 20 MG tablet      metoprolol tartrate (LOPRESSOR) 25 MG tablet      sodium bicarbonate 650 MG tablet      No current facility-administered medications for this visit.        Vitals:  BP (!) 175/99   Pulse 88   Temp 97.9  F (36.6  C) (Oral)   Ht 1.727 m (5' 8\")   Wt 77.9 kg (171 lb 12.8 oz)   SpO2 99%   BMI 26.12 kg/m       Exam:  GENERAL APPEARANCE: alert and no distress  HENT: mouth without ulcers or lesions. Good dentition  LYMPHATICS: no cervical or supraclavicular nodes  RESP: lungs clear to auscultation - no rales, rhonchi or wheezes  CV: regular rhythm, normal rate, no rub, no murmur  EDEMA: no LE edema bilaterally  ABDOMEN: soft, nondistended, nontender  MS: extremities normal - no gross deformities noted, no evidence of inflammation in joints, no muscle tenderness  SKIN: no rash  Femoral pulses 2+ equal bilaterally   DIALYSIS ACCESS:  None    Results:   Recent Results (from the past 336 hour(s))   EKG 12-lead, tracing only [EKG1]    Collection Time: 09/11/19 11:18 AM   Result Value Ref Range    Interpretation ECG Click View Image link to view waveform and result    Factor 2 and 5 mutation analysis    Collection Time: 09/11/19 12:19 PM   Result Value Ref Range    Copath Report       Patient Name: MIRIAM HARRIS  MR#: [id number]  Specimen #: [id number]  Collected: 9/11/2019 12:19  Received: 9/11/2019 17:08  Reported: 9/16/2019 16:08  Ordering " Phy(s): KIRBY PONCE    For improved result formatting, select 'View Enhanced Report Format' under   Linked Documents section.  _________________________________________    TEST(S) REQUESTED:  Factor 5 Leiden and Factor 2 by PCR    SPECIMEN DESCRIPTION:  Blood    METHODOLOGY:   The regions of genomic DNA containing the F8759M Factor V   Leiden gene mutation (Factor V  Leiden) and the Factor 2(Prothrombin U89798S) gene mutation were   simultaneously amplified using the polymerase  chain reaction.  The amplified products were digested with restriction   endonuclease TaqI and products were  analyzed by gel electrophoresis.    RESULTS:    Factor V 1691G>A (Leiden)  RESULTS:  Mutation analyzed:     1691G>A  Factor V 1691G>A (Leiden)  Interpretation:      ABSENT  Factor V 1691G>A (Leiden) mutation  genotype:      G/G    FACTOR 2/ PROTHROMBIN RESULTS:  Mutation analyzed:     77193L>A  Factor 2 Mutation Interpretation:      ABSENT  Factor 2 Mutation genotype:      G/G    INTERPRETATION:  The patient is negative for the Factor V 1691G>A (Leiden) and negative for   the Factor 2 mutation.    COMMENTS:  If a patient is the recipient of an allogeneic bone marrow transplant,   this test must be done on a  pre-transplant sample or buccal swab.  A previous allogeneic bone marrow   transplant will interfere with test  results.  Call the Molecular Diagnostics Lab([phone]) for   instructions on sample collection for these  patients.    This test was developed and its performance characteristics determined by   the [facility],  Molecular Diagnostics Laboratory. It has not been cleared or   approved by the FDA. The laboratory is  regulated under CLIA as qualified to perform high-complexity testing. This   test is used for clinical purposes.  It should not be regarded as investigational or for  research.    A resident/fellow in an accredited training program was involved in the   selection of  testing, review of  laboratory data, and/or interpretation of this case.  I, as the senior   physician, attest that I: (i) confirmed  appropriate testing, (ii) examined the relevant raw data for the   specimen(s); and (iii) rendered or confirmed  the interpretation(s).    Electronically Signed Out By:  VASILE PalmerPhysiciyusuf    CPT Codes:  A: 19171-B3GDKE, 31422-O8SMMJ, -XKDIHP(2)    TESTING LAB LOCATION:  [address] [address] [phone]    COLLECTION SITE:  Client:  [facility]  Location:  [facility] ()     HLA Typing Complete SOT Recipient    Collection Time: 09/11/19 12:51 PM   Result Value Ref Range    ABTest Method SSOP     A* locus A*11     A* A*26     B* locus B*44     B* B*54     C* locus C*01     C* C*05     Bw-1 Bw*4     Bw-2 Bw*6     Drsso Test Method SSOP     DRB1* locus DRB1*04     DRB1* DRB1*13     DRB3* locus DRB3*01     DRB4* DRB4*01     DQB1* locus DQB1*04     DQB1* DQB1*06     DQA1*locus DQA1*01     DQA1* DQA1*03     DPB1* DPB1*03:01     DPB1* NMDP BRVPN     DPB1*locus DPB1*04:02     DPB1* locus NMDP BPPGC     DPA1* DPA1*01:03     DPA1* NMDP BPGMP    PRA Single Antigen IgG Antibody    Collection Time: 09/11/19 12:51 PM   Result Value Ref Range    SA1 Test Method SA FCS     SA1 Cell Class I     SA1 Hi Risk Mary B:8 60     SA1 Mod Risk Mary B:7 13 27 48 59 61 81 Cw:17     SA1 Comments        Test performed by modified procedure. Serum heat inactivated and tested   by a modified ([place]) protocol including fetal calf serum addition.   High-risk, mfi >3,000. Mod-risk, mfi 500-3,000.      SA2 Test Method SA FCS     SA2 Cell Class II     SA2 Hi Risk Mary DQ:2     SA2 Mod Risk Mary DP:19     SA2 Comments        Test performed by modified procedure. Serum heat inactivated and tested   by a modified ([place]) protocol including fetal calf serum addition.   High-risk, mfi  >3,000. Mod-risk, mfi 500-3,000.      Protocol Cutoff Plan A, 500 mfi cumulative      UNOS cPRA 70     Unacceptable Antigen B:7 8 13 27 48 59 60 61 81 DQ:2    CMV Antibody IgG [PXC4964]    Collection Time: 09/11/19 12:51 PM   Result Value Ref Range    CMV Antibody IgG <0.2 0.0 - 0.8 AI   EBV Capsid Antibody IgG [[id number]]    Collection Time: 09/11/19 12:51 PM   Result Value Ref Range    EBV Capsid Antibody IgG 3.7 (H) 0.0 - 0.8 AI   Hepatitis B core antibody [JZV2128]    Collection Time: 09/11/19 12:51 PM   Result Value Ref Range    Hepatitis B Core Mary Nonreactive NR^Nonreactive   Hepatitis B Surface Antibody [[id number]]    Collection Time: 09/11/19 12:51 PM   Result Value Ref Range    Hepatitis B Surface Antibody 63.21 (H) <8.00 m[IU]/mL   Hepatitis B surface antigen [HCM823]    Collection Time: 09/11/19 12:51 PM   Result Value Ref Range    Hep B Surface Agn Nonreactive NR^Nonreactive   Hepatitis C antibody [XBF419]    Collection Time: 09/11/19 12:51 PM   Result Value Ref Range    Hepatitis C Antibody Nonreactive NR^Nonreactive   HIV Antigen Antibody Combo Pretransplant    Collection Time: 09/11/19 12:51 PM   Result Value Ref Range    HIV Antigen Antibody Combo Pretransplant Nonreactive NR^Nonreactive   Varicella Zoster Virus Antibody IgG [OMC4610]    Collection Time: 09/11/19 12:51 PM   Result Value Ref Range    Varicella Zoster Virus Antibody IgG 0.6 0.0 - 0.8 AI   Treponema Abs w Reflex to RPR and Titer    Collection Time: 09/11/19 12:51 PM   Result Value Ref Range    Treponema Antibodies Nonreactive NR^Nonreactive   Thrombin time [QCR363]    Collection Time: 09/11/19 12:51 PM   Result Value Ref Range    Thrombin Time 16.7 13.0 - 19.0 sec   Partial thromboplastin time [LAB56]    Collection Time: 09/11/19 12:51 PM   Result Value Ref Range    PTT 28 22 - 37 sec   INR [ASQ4128]    Collection Time: 09/11/19 12:51 PM   Result Value Ref Range    INR 1.07 0.86 - 1.14   Lupus Anticoagulant Panel [IMZ1792]    Collection  Time: 09/11/19 12:51 PM   Result Value Ref Range    Lupus Result Negative NEG^Negative   Cardiolipin Mary IgG and IgM [LAB 6836]    Collection Time: 09/11/19 12:51 PM   Result Value Ref Range    Cardiolipin Antibody IgG <1.6 0.0 - 19.9 GPL-U/mL    Cardiolipin Antibody IgM <0.2 0.0 - 19.9 MPL-U/mL   CBC with platelets differential [PVA160]    Collection Time: 09/11/19 12:51 PM   Result Value Ref Range    WBC 11.5 (H) 4.0 - 11.0 10e9/L    RBC Count 4.06 (L) 4.4 - 5.9 10e12/L    Hemoglobin 11.6 (L) 13.3 - 17.7 g/dL    Hematocrit 34.7 (L) 40.0 - 53.0 %    MCV 86 78 - 100 fl    MCH 28.6 26.5 - 33.0 pg    MCHC 33.4 31.5 - 36.5 g/dL    RDW 13.7 10.0 - 15.0 %    Platelet Count 223 150 - 450 10e9/L    Diff Method Automated Method     % Neutrophils 80.4 %    % Lymphocytes 13.3 %    % Monocytes 4.5 %    % Eosinophils 0.0 %    % Basophils 0.6 %    % Immature Granulocytes 1.2 %    Nucleated RBCs 0 0 /100    Absolute Neutrophil 9.2 (H) 1.6 - 8.3 10e9/L    Absolute Lymphocytes 1.5 0.8 - 5.3 10e9/L    Absolute Monocytes 0.5 0.0 - 1.3 10e9/L    Absolute Eosinophils 0.0 0.0 - 0.7 10e9/L    Absolute Basophils 0.1 0.0 - 0.2 10e9/L    Abs Immature Granulocytes 0.1 0 - 0.4 10e9/L    Absolute Nucleated RBC 0.0    Quantiferon TB Gold Plus    Collection Time: 09/11/19 12:51 PM   Result Value Ref Range    Quantiferon-TB Gold Plus Result Negative NEG^Negative    TB1 Ag minus Nil Value 0.00 IU/mL    TB2 Ag minus Nil Value 0.01 IU/mL    Mitogen minus Nil Result 3.77 IU/mL    Nil Result 0.02 IU/mL   Comprehensive metabolic panel [LAB17]    Collection Time: 09/11/19 12:51 PM   Result Value Ref Range    Sodium 136 133 - 144 mmol/L    Potassium 4.3 3.4 - 5.3 mmol/L    Chloride 105 94 - 109 mmol/L    Carbon Dioxide 20 20 - 32 mmol/L    Anion Gap 11 3 - 14 mmol/L    Glucose 116 (H) 70 - 99 mg/dL    Urea Nitrogen 78 (H) 7 - 30 mg/dL    Creatinine 4.79 (H) 0.66 - 1.25 mg/dL    GFR Estimate 15 (L) >60 mL/min/[1.73_m2]    GFR Estimate If Black 17 (L) >60  mL/min/[1.73_m2]    Calcium 9.2 8.5 - 10.1 mg/dL    Bilirubin Total 0.3 0.2 - 1.3 mg/dL    Albumin 3.8 3.4 - 5.0 g/dL    Protein Total 8.5 6.8 - 8.8 g/dL    Alkaline Phosphatase 123 40 - 150 U/L    ALT 30 0 - 70 U/L    AST 7 0 - 45 U/L   Blood Group A Subtype [[id number]]    Collection Time: 09/11/19 12:51 PM   Result Value Ref Range    Antigen Type Canceled, Test credited     Blood Bank Comment       Pt is not ABO type A or AB. A subtyping not indicated. 9/11/19 JS   Antibody titer red cell [[id number]]    Collection Time: 09/11/19 12:51 PM   Result Value Ref Range    Antibody Titer       ANTI A1 TITER IgM 16, IgG 8.  ANTI A2 TITER IgM 2, IgG 1.  Patient also has an antibody with no identifiable specific reactivity that may falsely   elevate titer value.     ABO/Rh type and screen    Collection Time: 09/11/19 12:52 PM   Result Value Ref Range    ABO B     RH(D) Pos     Antibody Screen Pos (A)     Test Valid Only At          [facility]    Specimen Expires 09/14/2019     Antibody Identification       Unidentified Antibody Present  Antibody with no identifiable specific reactivity.      Blood Bank Comment       Delay in availability of Red Blood Cells called to  Daniela Darling PAC reviewed in Epic at 1050 on 09/12/19. MT     ABO type [[id number]]    Collection Time: 09/11/19 12:52 PM   Result Value Ref Range    ABO B     RH(D) Pos     Specimen Expires 09/14/2019    Routine UA with microscopic    Collection Time: 09/11/19  1:00 PM   Result Value Ref Range    Color Urine Straw     Appearance Urine Clear     Glucose Urine 50 (A) NEG^Negative mg/dL    Bilirubin Urine Negative NEG^Negative    Ketones Urine Negative NEG^Negative mg/dL    Specific Gravity Urine 1.010 1.003 - 1.035    Blood Urine Small (A) NEG^Negative    pH Urine 6.0 5.0 - 7.0 pH    Protein Albumin Urine >499 (A) NEG^Negative mg/dL    Urobilinogen mg/dL 0.0 0.0 - 2.0 mg/dL    Nitrite Urine Negative NEG^Negative     Leukocyte Esterase Urine Negative NEG^Negative    Source Midstream Urine     WBC Urine <1 0 - 5 /HPF    RBC Urine 7 (H) 0 - 2 /HPF    Mucous Urine Present (A) NEG^Negative /LPF     Patient was seen by myself, Dr. Erick Mcgrath, in conjunction with Daniela Darling PA-C as part of a shared visit.    I personally reviewed past medical and surgical history, vital signs, medications and labs.  Present and past medical history, along with significant physical exam findings were all reviewed with CHEIKH.    My modi findings:  Vinnie Jean Baptiste is 29 year old, who presents for Kidney transplant evaluation.    Key management decisions made by me and discussed with CHEIKH:  1.  Transplant can see evaluation.  2.  IgA nephropathy with tonsillar hypertrophy: Discussed the potential utility of tonsillectomy prior to transplantation.  3.  Mild leuko-cytosis: Would repeat CBC and if persistent will consider peripheral smear.  4.  Cardiovascular risk stratification patient appears to be average cardiac risk.  Discussion:  Vinnie appears to be an excellent candidate for kidney transplantation.  Discussed the benefit of living donor kidney transplantation and he interested in proceeding with that.  Due to his IgA nephropathy and his ethnic background we discussed the utility of tonsillectomy.  While this is not a condition for participation is encouraged to eliminate is a possibility for triggering IgA recurrence after transplantation.         Again, thank you for allowing me to participate in the care of your patient.      Sincerely,    GEORGE

## 2019-09-11 NOTE — PROGRESS NOTES
Psychosocial Assessment  Patient Name/ Age: Vinnie Jean Baptiste 29 year old   Medical Record #: [id number]  Duration of Interview:30 min  Process:   Face-to-Face Interview                (counseling < 50%)   Present at Appointment: Vinnie        : VENKATESH Smith Date:  September 11, 2019        Type of transplant: Kidney    Donor type: Father and brother are interested in being donors     Cadaver, brother, and parent   Prior Transplants:    No Status of Transplant: n/a           Current Living Situation    Location:   [address] [place] [address]  With Whom: lives with their family (parents, his brother, and his brother's wife)       Family/ Social Support:    Vinnie reported he has a 6 year old son, Jame. His brother Jr Josr lives with him as well as his parents Josr  and his mother Mathieu.    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    Income   Salary/wages   Insurance      At this time, patient can afford medication costs:  Yes  Private Insurance- BC through employer       Medical Status    Current Mode of Treatment for ESRD None   Complications None       Behavioral    Tobacco Use No Chemical Dependency No   Vinnie denied tobacco use.  Vinnie reported he does not drink any alcohol. He reported he did prior to his health declining but has stopped drinking (and smoking cigarettes) since. He denied any current or history of substance use or chemical dependency treatment.      Psychiatric Impairment No  Vinnie denied any current or history of anxiety, depression, or other mental health concerns.     Reading Ability: Good  Education  Level: Bachelors Degree Recent Legal History No      Coping Style/Strategies: Listen to music, take alone time       Ability to Adhere to Complex Medical Regime: Yes     Adherence History: Vinnie reported he follows his physician's recommendations, takes his medications as prescribed, and attends his appointments. Per chart review, Vinnie was lost to follow up from [id number]. When asked about this, Vinnie admitted he felt fine so he did not go to the doctor. Vinnie reported he understands the importance of follow up and attending appointments. It was also noted that Vinnie can be hard to get a hold of. Engaged in discussion on the importance of Vinnie being available in the event the medical team has to get a hold of him. He stated he understood and as of recently has done a better job at responding to phone calls.         Education  _X_ Medicare  _X_ Rehabilitation  _X_ Donor issues  _X_ Community resources  _X_ Post discharge housing  _X_ Financial resources  _X_ Medical insurance options  _X_ Psych adjustment  _X_ Family adjustment  _X_ Health Care Directive Provided Education and Declined Completing    Psychosocial Risks of Transplant Reviewed and Discussed:  _X_ Increased stress related to emotional,            family, social, employment or financial           situation  _X_ Affect on work and/or disability benefits  _X_ Affect on future health and life           insurance  _X_ Transplant outcome expectations may           not be met  _X_ Mental Health Risks: anxiety,           depression, PTSD, guilt, grief and           chronic fatigue     Notable Items:   None noted.       Final Evaluation/Assessment   Patient seemed to process information well. Appeared well informed, motivated and able to follow post transplant requirements. Behavior was appropriate during interview. Has adequate income and insurance coverage. Adequate social support. No major contraindications noted for transplant.  At this time patient appears to  understand the risks and benefits of transplant.      Recommendation  Acceptable    Selection Criteria Met:  Plan for support Yes   Chemical Dependence Yes   Smoking Yes   Mental Health Yes   Adequate Finances Yes    Signature: VENKATESH Smith [facility]   Title: Clinical

## 2019-09-11 NOTE — NURSING NOTE
"Chief Complaint   Patient presents with     Transplant Evaluation     Kidney eval     Blood pressure (!) 175/99, pulse 88, temperature 97.9  F (36.6  C), temperature source Oral, height 1.727 m (5' 8\"), weight 77.9 kg (171 lb 12.8 oz), SpO2 99 %.    Annette Mann CMA on 9/11/2019 at 7:23 AM    "

## 2019-09-12 LAB
ABO + RH BLD: ABNORMAL
ABO + RH BLD: ABNORMAL
BLD GP AB INVEST PLASRBC-IMP: ABNORMAL
BLD GP AB SCN SERPL QL: ABNORMAL
BLD GP AB SCN TITR SERPL: NORMAL {TITER}
BLOOD BANK CMNT PATIENT-IMP: ABNORMAL
BLOOD BANK CMNT PATIENT-IMP: ABNORMAL
CARDIOLIPIN ANTIBODY IGG: <1.6 GPL-U/ML (ref 0–19.9)
CARDIOLIPIN ANTIBODY IGM: <0.2 MPL-U/ML (ref 0–19.9)
CMV IGG SERPL QL IA: <0.2 AI (ref 0–0.8)
EBV VCA IGG SER QL IA: 3.7 AI (ref 0–0.8)
HBV CORE AB SERPL QL IA: NONREACTIVE
HBV SURFACE AG SERPL QL IA: NONREACTIVE
HIV 1+2 AB+HIV1 P24 AG SERPL QL IA: NONREACTIVE
INTERPRETATION ECG - MUSE: NORMAL
SPECIMEN EXP DATE BLD: ABNORMAL
T PALLIDUM AB SER QL: NONREACTIVE
THROMBIN TIME: 16.7 SEC (ref 13–19)
VZV IGG SER QL IA: 0.6 AI (ref 0–0.8)

## 2019-09-12 NOTE — PROGRESS NOTES
RE: Vinnie Jean Baptiste    [facility]# [id number]        I saw your patient, Vinnie Jean Baptiste, in consultation in our pretransplant clinic.  He was at clinic to discuss care for his end-stage renal disease.  Prior to clinic, he attended our pretransplant class.       We talked about the pros and cons of transplantation vs. dialysis.  We discussed the fact that it was important that he think about the pros and cons of each treatment option and make an active decision.  We also discussed the fact that the two were interconnected and he may need to go on dialysis before transplant (if he chose to have a transplant) and that if the transplant failed, he might need dialysis before another transplant.       We also discussed the fact that if he chose to have a transplant, he would need to decide between going on the wait list for a  donor transplant vs. having a living donor transplant.  We talked about the pros and cons of each option.  Although I didn't express an opinion regarding transplantation or dialysis, I suggested that if he chose to have a transplant, a living donor transplant would be preferable in that the surgery is the same, the immunosuppressive drugs and the risks are the same, but the transplant could be done sooner and the results are better.  I told him that the wait for  donor kidney was approximately five years for patients who are newly put on the waiting list.  In addition, we talked about the fact that the disadvantage of a living donor transplant was the risk to the donor.       I attempted to answer any remaining questions.  I also told him that should he have any questions, he should feel free to contact us.  We would be glad to answer any questions either over the phone or at another clinic visit.  His transplant coordinator is Montse Ritter and may be reached at [phone].  Thank you for the opportunity to see him.     I spent 25 minutes with this patient.  Over 90% of that time was spent in  counseling and coordination of care.             Yours truly,               Alex Zavaleta MD         Professor of Surgery         ([phone])    BENI/st

## 2019-09-13 LAB
A* LOCUS: NORMAL
A*: NORMAL
ABTEST METHOD: NORMAL
B* LOCUS: NORMAL
B*: NORMAL
BW-1: NORMAL
BW-2: NORMAL
C* LOCUS: NORMAL
C*: NORMAL
DPA1* NMDP: NORMAL
DPA1*: NORMAL
DPB1* LOCUS NMDP: NORMAL
DPB1* NMDP: NORMAL
DPB1*: NORMAL
DPB1*LOCUS: NORMAL
DQA1*: NORMAL
DQA1*LOCUS: NORMAL
DQB1* LOCUS: NORMAL
DQB1*: NORMAL
DRB1* LOCUS: NORMAL
DRB1*: NORMAL
DRB3* LOCUS: NORMAL
DRB4*: NORMAL
DRSSO TEST METHOD: NORMAL
GAMMA INTERFERON BACKGROUND BLD IA-ACNC: 0.02 IU/ML
LA PPP-IMP: NEGATIVE
M TB IFN-G BLD-IMP: NEGATIVE
M TB IFN-G CD4+ BCKGRND COR BLD-ACNC: 3.77 IU/ML
MITOGEN IGNF BCKGRD COR BLD-ACNC: 0 IU/ML
MITOGEN IGNF BCKGRD COR BLD-ACNC: 0.01 IU/ML
PROTOCOL CUTOFF: NORMAL
SA1 CELL: NORMAL
SA1 COMMENTS: NORMAL
SA1 HI RISK ABY: NORMAL
SA1 MOD RISK ABY: NORMAL
SA1 TEST METHOD: NORMAL
SA2 CELL: NORMAL
SA2 COMMENTS: NORMAL
SA2 HI RISK ABY UA: NORMAL
SA2 MOD RISK ABY: NORMAL
SA2 TEST METHOD: NORMAL
UNACCEPTABLE ANTIGEN: NORMAL
UNOS CPRA: 70

## 2019-09-16 LAB
COPATH REPORT: NORMAL
HBV SURFACE AB SERPL IA-ACNC: 63.21 M[IU]/ML
HCV AB SERPL QL IA: NONREACTIVE

## 2019-09-25 ENCOUNTER — COMMITTEE REVIEW (OUTPATIENT)
Dept: TRANSPLANT | Facility: CLINIC | Age: 29
End: 2019-09-25

## 2019-09-25 NOTE — LETTER
September 26, 2019    Vinnie Jean Baptiste  [address] [place] [id number]        Dear Vinnie,     It was a pleasure to see you here recently for consideration of a kidney transplant. Your pre-transplant evaluation began on September 11, 2019. Your evaluation results were discussed at our Multidisciplinary Selection Committee on September 25, 2019.The Committee has approved you as a transplant candidate pending the successful completion of your evaluation. Here are the things we still need to complete:    1. Your WBC count was a little high at 11.5. WBC is white blood count and is an indicator of infection. We would like you to have this repeated at your primary care clinic. Call me when me when complete.    2. You will need to make an appointment with your dentist and have any recommended work done. Call me when complete.    3. Your blood work shows you do not have any immunity to chickenpox or varicella. You will need this vaccination before a transplant can be done. This is a live vaccination and you must wait 6 weeks after the vaccination before we can safely go ahead with a transplant and the immunosuppression. Please call your primary care provider and get this vaccination done as soon as possible and call me once you get it.    4. Your blood work also shows you do not have immunity to Hepatitis B. You will need the series to be started before transplant. You will also need the pneumovax against pneumonia. These are not live vaccinations but please get them done soon. Call your primary care provider to schedule these vaccinations and call me when complete.    5. Dr Mcgrath spoke to you about considering having your tonsils removed before transplant. Please let me know what your thoughts are on this.     You have already been added onto the kidney transplant wait list on 9/25/2019 but are on INACTIVE status due to needing to successfully complete the above items. You are now accruing waiting time. A separate letter  regarding your listing has been mailed. You will be notified by our office as to when your status can be changed to ACTIVE.    Please have any potential living donors register now online with our program to initiate their evaluation at [email or web address] or call [phone]. You will be notified by our office in the event of an approved live donor.     Please feel free to call me with any questions at [phone].    Sincerely,     Montse Ritter RN, BSN Transplant Coordinator  Solid Organ Transplant [address]  [address] [place] [address]  Phone [phone]  Fax [phone]  [email or web address]    CC:ERICKA Joshi, CNP (PCP);  Joel Duran MD

## 2019-09-25 NOTE — LETTER
2019    Vinnie Jean Baptiste  [address] [place] [address]      Dear Vinnie,    This letter is sent to confirm that you have nearlycompleted your transplant work-up and you are a candidate in the kidney transplant program at the [facility].  You were placed on the kidney inactive waitlist on 2019.  This means you will accumulate waiting time but not receive  donor calls.       Items we will need from you:      We have received approval from you insurance company for the transplant procedure.  It is critical that you notify us if there is any change in your insurance.  It is also important that you familiarize yourself with the details of your specific insurance policy.  Our patient  is available to assist you if you should have any questions regarding your coverage.      An ALA or PRA blood sample will need to be sent here every 3 months to match you with  donors or any potential living donors.  If you need this testing, special mailing boxes (called mailers) will be sent to you directly from the Outreach Department. You should take the physician order form and the  to your home laboratory when it is time to for this testing to be done.  Additional mailers can be obtained by calling the Transplant Office and asking to speak to a kidney . I will let you know when you will need to start having these labs drawn.      During this waiting period, we may request additional periodic laboratory tests with your primary physician.  It will be your responsibility to remind your physician to forward your results to the Transplant Office.      We need to be kept informed of any changes in your medical condition such as:    o changes in your medications,   o significant changes in your health  o significant infections (such as pneumonia or abscesses)  o blood transfusions  o any condition which requires  hospitalization  o any surgery  o If you start dialysis      Remember to complete any routine cancer screening tests required before your transplant.  This includes colonoscopy; prostrate screening for men, and mammogram and gynecologic testing for women, as well as dental work.  Your primary care clinic can assist you with arranging for these exams.  Remind your caregivers to forward copies of the records and final reports.    We want you to know that our program has physician and surgeon coverage 24 hours a day, 365 days a year. If this coverage changes or there are substantial program changes, you will be notified in writing by letter.     Attached is a letter from the [employer] ([employer]). It describes the services and information offered to patients by [employer] and the Organ Procurement and Transplantation Network.    We appreciate having had the opportunity to participate in your care.  If you have questions, please feel free to call the Transplant Office at [phone] or [phone].      Sincerely,     Kidney Transplant Program    Enclosures: Telephone Contact List, Travel Resources, [employer] Letter, Waitlist Information Update and While You Are Waiting  CC:   JUNG Joshi, CNP (PCP); Joel Rivera MD

## 2019-09-25 NOTE — COMMITTEE REVIEW
Abdominal Committee Review Note     Evaluation Date: 9/11/2019  Committee Review Date: 9/25/2019    Organ being evaluated for: Kidney    Transplant Phase: Evaluation  Transplant Status: Active    Transplant Coordinator: Evangelina Ritter  Transplant Surgeon:       Referring Physician: Joel Rivera    Primary Diagnosis: IgA Nephropathy  Secondary Diagnosis:     Committee Review Members:  Nephrology Von Rojas MD, Dandy Mccoy, APRN CNP, Erick Mcgrath MD   Nurse Loretta Monaco, RN, Rose Brito, RN   Nutrition Deborah Sosa, OMAR   Pharmacist Melchor Henry, [facility]    - Clinical Toña Drake, [facility], Colleen Martinez, [facility]   Transplant Rashmi Juliette Funes, RN, Deandra Boston, DELMIS, Imani Jara, RN, Valerie Lenz, RN, Farida Valentin, RN, Madai Romero, NP, Stormy Eisenberg RN, Aliya Gonzalez, DELMIS, Danny Stokes MD, Evangelina Ritter, DELMIS   Transplant Surgery Melody Bustamante MD, MD       Transplant Eligibility: Irreversible chronic kidney disease treated w/dialysis or expected need for dialysis    Committee Review Decision: Approved    Relative Contraindications: None    Absolute Contraindications: None    Committee Chair Melody Bustamante MD verbally attested to the committee's decision.    Committee Discussion Details: Reviewed medical records and evaluation results to date. Patient is approved as a kidney transplant candidate pending the completion of his evaluation. He is not on dialysis and has a qualifying GFR of 15 on 9/11/2019 so will be listed as inactive. He will need a repeat WBC with his PCP and the varicella vaccination. The vaccination will keep him inactive for 6 weeks after. He will need the Hepatitis B series and the pneumovax.  Dental will need to be completed. Dr Mcgrath did speak with him about having his tonsils removed. Committee did not feel he needed PFT's, cardiology nor was there concern about the RBC in his urine.  Patient  will be called and inactive listing letter will be sent. PA will need to be confirmed before active status.

## 2019-09-26 ENCOUNTER — TELEPHONE (OUTPATIENT)
Dept: TRANSPLANT | Facility: CLINIC | Age: 29
End: 2019-09-26

## 2019-09-26 ENCOUNTER — DOCUMENTATION ONLY (OUTPATIENT)
Dept: TRANSPLANT | Facility: CLINIC | Age: 29
End: 2019-09-26

## 2019-09-26 NOTE — PROGRESS NOTES
Patient was added onto the  donor wait list as INACTIVE yesterday 2019. He has a qualifying GFR of 15 on 2019. He will need to complete his evaluation. Inactive listing letter has been sent.

## 2019-09-26 NOTE — TELEPHONE ENCOUNTER
Reached Vinnie's voice mail and left a long message because I know he is at work. He has been approved as a kidney transplant candidate and was actually listed yesterday 9/25/2019 as inactive to start his waiting time. He is not on dialysis. He will need to complete his evaluation. I told him I will send him a transplant summary letter outlining what he needs to do. His WBC was elevated here and that needs to be re-checked with his PCP. Dr Mcgrath talked to him about getting his tonsils out and wanted to know his thoughts on this. He needs dental. He also will need Hepatitis B and pneumovax. He is not immune for varicella and will need the live vaccination.This will make him ineligible for active status for 6 weeks post Varicella vaccination. I asked Vinnie to return my call so we can discuss and see if he has any questions. Transplant summary letter will be sent.

## 2019-10-01 ENCOUNTER — TELEPHONE (OUTPATIENT)
Dept: TRANSPLANT | Facility: CLINIC | Age: 29
End: 2019-10-01

## 2019-10-01 NOTE — TELEPHONE ENCOUNTER
Tried to reach Vinnie today to discuss the outcome of the Selection Committee from 9/25/2019. I left a message and asked him to return my call.

## 2019-10-03 ENCOUNTER — TELEPHONE (OUTPATIENT)
Dept: TRANSPLANT | Facility: CLINIC | Age: 29
End: 2019-10-03

## 2019-10-03 NOTE — TELEPHONE ENCOUNTER
Vinnie returned my call and we discussed the outcome of the Selection Committee from 2019. He is approved as a kidney transplant candidate and has been added onto the  donor wait list as INACTIVE to start his wait time while he finishes his evaluation. He has a qualifying GFR of 15 on 2019. He already got his Varicella and pneumovax vaccinations. I had indicated to him he also needed Hepatitis B in the transplant summary and he checked with his nephrologist and does not need it. Indeed, after another review of his virology done here, he is immune to Hepatitis B. Apologized for the error on my part. He does understand the Varicella is a live vaccination and we could not transplant him for 6 weeks. He received the vaccination on 10/1/2019. His WBC was elevated at evaluation and he will need a repeat with his PCP. I asked him to call his PCP and have them put the lab order in and get it drawn and let me know so I can obtain the results. He will need a few cavities taken care of and will call me when complete. He has declined to have a tonsillectomy that was recommended by Dr Mcgrath. Message sent to Dr Mcgrath. He did tell me his brother has stepped forward and offered him a kidney. We talked about how he should go about registering either online or call the donor number. I also reminded him the best thing for him would be a live donor kidney transplant before he ever starts dialysis. He stated he understood. Transplant summary and listing letters have been sent.

## 2020-01-23 ENCOUNTER — TELEPHONE (OUTPATIENT)
Dept: TRANSPLANT | Facility: CLINIC | Age: 30
End: 2020-01-23

## 2020-01-23 ENCOUNTER — DOCUMENTATION ONLY (OUTPATIENT)
Dept: TRANSPLANT | Facility: CLINIC | Age: 30
End: 2020-01-23

## 2020-01-23 NOTE — TELEPHONE ENCOUNTER
Called patient to inform him he is now Active on kidney wait list. Discussed ALA/PRA samples needs, post tx plans, and that his brother is a potential living donor. Writer sent ALA Orders and instruction to patient and requested kits/mailers be sent asap.

## 2020-01-28 ENCOUNTER — DOCUMENTATION ONLY (OUTPATIENT)
Dept: TRANSPLANT | Facility: CLINIC | Age: 30
End: 2020-01-28

## 2020-02-10 ENCOUNTER — TELEPHONE (OUTPATIENT)
Dept: TRANSPLANT | Facility: CLINIC | Age: 30
End: 2020-02-10

## 2020-02-10 NOTE — TELEPHONE ENCOUNTER
Spoke with Berenice,  from [facility]. Reviewed pt's primary nephrologist would be able to comment if pt is compliant with his medications. Pt is overdue for PRA/HLA but otherwise is ok from a transplant perspective. Reviewed pt is not due to come back from return [facility] Appointments until Fall 2021.

## 2020-02-13 ENCOUNTER — TELEPHONE (OUTPATIENT)
Dept: TRANSPLANT | Facility: CLINIC | Age: 30
End: 2020-02-13

## 2020-02-13 NOTE — TELEPHONE ENCOUNTER
Left message on patient's voice mail asking for him to let us know when he will be going to his local clinic, hospital, or lab for his required ALA/PRA samples. If we don't get them next week we may need to make him inactive until he can start sending the samples on a regular basis.    wife

## 2020-02-18 DIAGNOSIS — N18.6 ESRD (END STAGE RENAL DISEASE) (H): ICD-10-CM

## 2020-02-18 DIAGNOSIS — Z76.82 AWAITING ORGAN TRANSPLANT: ICD-10-CM

## 2020-02-19 DIAGNOSIS — Z76.82 AWAITING ORGAN TRANSPLANT: ICD-10-CM

## 2020-02-19 DIAGNOSIS — N18.6 ESRD (END STAGE RENAL DISEASE) (H): Primary | ICD-10-CM

## 2020-02-21 LAB
PROTOCOL CUTOFF: NORMAL
SA1 CELL: NORMAL
SA1 COMMENTS: NORMAL
SA1 HI RISK ABY: NORMAL
SA1 MOD RISK ABY: NORMAL
SA1 TEST METHOD: NORMAL
SA2 CELL: NORMAL
SA2 COMMENTS: NORMAL
SA2 HI RISK ABY UA: NORMAL
SA2 MOD RISK ABY: NORMAL
SA2 TEST METHOD: NORMAL
UNACCEPTABLE ANTIGEN: NORMAL
UNOS CPRA: 70

## 2020-04-28 ENCOUNTER — TELEPHONE (OUTPATIENT)
Dept: TRANSPLANT | Facility: CLINIC | Age: 30
End: 2020-04-28

## 2020-04-28 NOTE — TELEPHONE ENCOUNTER
Provider  Call: Voicemail  Date/Time: 2020  3827  Reason for call: Pt seen NP- Ava Oviedo yesterday  They need to let us know kidney function down  starting dialysis next week  Wonders if we are delaying kidney tx due too COVID 19  Pt sent xm in 2 months ago with his donor and they have not heard back with results for them  Are we postponing donor work up due to COVID 19    Call them back  [phone] opt 3     Coordinator called DELMIS Dupont with pt's primary nephrologist back and left msg. Requested they call back with pt's dialysis center info. Confirmed with donor team that pt does not have any approved donors. We are still doing  transplants during COVID19 crisis and living donor transplants are being considered on a case by case basis. Contact information provided.

## 2020-05-13 ENCOUNTER — RESULTS ONLY (OUTPATIENT)
Dept: OTHER | Facility: CLINIC | Age: 30
End: 2020-05-13

## 2020-05-13 DIAGNOSIS — Z76.82 AWAITING ORGAN TRANSPLANT: ICD-10-CM

## 2020-05-13 DIAGNOSIS — N18.6 ESRD (END STAGE RENAL DISEASE) (H): ICD-10-CM

## 2020-05-18 LAB
PROTOCOL CUTOFF: NORMAL
SA1 CELL: NORMAL
SA1 COMMENTS: NORMAL
SA1 HI RISK ABY: NORMAL
SA1 MOD RISK ABY: NORMAL
SA1 TEST METHOD: NORMAL
SA2 CELL: NORMAL
SA2 COMMENTS: NORMAL
SA2 HI RISK ABY UA: NORMAL
SA2 MOD RISK ABY: NORMAL
SA2 TEST METHOD: NORMAL
UNACCEPTABLE ANTIGEN: NORMAL
UNOS CPRA: 74

## 2020-05-20 DIAGNOSIS — N18.6 ESRD (END STAGE RENAL DISEASE) (H): Primary | ICD-10-CM

## 2020-05-29 DIAGNOSIS — N18.6 ESRD (END STAGE RENAL DISEASE) (H): ICD-10-CM

## 2020-06-02 LAB
COMMENT VXMB1: NORMAL
COMMENT VXMT1: NORMAL
CROSSMATCHDATEVXM: NORMAL
DONOR VXM: NORMAL
DSA VXM B1: NORMAL
DSA VXMT1: NORMAL
RESULT VXM B1: NORMAL
RESULT VXM T1: NORMAL
SERUM DATE VXM B1: NORMAL
SERUM DATE VXM T1: NORMAL

## 2020-08-06 ENCOUNTER — APPOINTMENT (OUTPATIENT)
Dept: LAB | Facility: CLINIC | Age: 30
End: 2020-08-06
Attending: TRANSPLANT SURGERY
Payer: COMMERCIAL

## 2020-08-06 DIAGNOSIS — N18.6 ESRD (END STAGE RENAL DISEASE) (H): ICD-10-CM

## 2020-08-06 DIAGNOSIS — Z76.82 AWAITING ORGAN TRANSPLANT: ICD-10-CM

## 2020-08-11 LAB
PROTOCOL CUTOFF: NORMAL
SA1 CELL: NORMAL
SA1 COMMENTS: NORMAL
SA1 HI RISK ABY: NORMAL
SA1 MOD RISK ABY: NORMAL
SA1 TEST METHOD: NORMAL
SA2 CELL: NORMAL
SA2 COMMENTS: NORMAL
SA2 HI RISK ABY UA: NORMAL
SA2 MOD RISK ABY: NORMAL
SA2 TEST METHOD: NORMAL
UNACCEPTABLE ANTIGEN: NORMAL
UNOS CPRA: 77

## 2020-11-05 DIAGNOSIS — N18.6 ESRD (END STAGE RENAL DISEASE) (H): ICD-10-CM

## 2020-11-05 DIAGNOSIS — Z76.82 AWAITING ORGAN TRANSPLANT: ICD-10-CM

## 2021-01-07 DIAGNOSIS — Z76.82 ORGAN TRANSPLANT CANDIDATE: ICD-10-CM

## 2021-01-07 DIAGNOSIS — N18.6 ESRD (END STAGE RENAL DISEASE) (H): ICD-10-CM

## 2021-01-15 ENCOUNTER — TELEPHONE (OUTPATIENT)
Dept: TRANSPLANT | Facility: CLINIC | Age: 31
End: 2021-01-15

## 2021-01-15 NOTE — TELEPHONE ENCOUNTER
Left message on Mobile phone (home number is disconnected) for patient to schedule Social Work and Nephrology CHEIKH appointments.  Asked patient to call back to schedule.

## 2021-01-19 ENCOUNTER — DOCUMENTATION ONLY (OUTPATIENT)
Dept: TRANSPLANT | Facility: CLINIC | Age: 31
End: 2021-01-19

## 2021-02-04 ENCOUNTER — APPOINTMENT (OUTPATIENT)
Dept: LAB | Facility: CLINIC | Age: 31
End: 2021-02-04
Attending: TRANSPLANT SURGERY
Payer: COMMERCIAL

## 2021-02-04 ENCOUNTER — RESULTS ONLY (OUTPATIENT)
Dept: OTHER | Facility: CLINIC | Age: 31
End: 2021-02-04

## 2021-02-05 DIAGNOSIS — Z76.82 AWAITING ORGAN TRANSPLANT: ICD-10-CM

## 2021-02-05 DIAGNOSIS — N18.6 ESRD (END STAGE RENAL DISEASE) (H): Primary | ICD-10-CM

## 2021-02-05 DIAGNOSIS — N18.6 ESRD (END STAGE RENAL DISEASE) (H): ICD-10-CM

## 2021-06-30 ENCOUNTER — DOCUMENTATION ONLY (OUTPATIENT)
Dept: TRANSPLANT | Facility: CLINIC | Age: 31
End: 2021-06-30

## 2021-07-27 ENCOUNTER — DOCUMENTATION ONLY (OUTPATIENT)
Dept: TRANSPLANT | Facility: CLINIC | Age: 31
End: 2021-07-27

## 2021-08-05 ENCOUNTER — TRANSFERRED RECORDS (OUTPATIENT)
Dept: HEALTH INFORMATION MANAGEMENT | Facility: CLINIC | Age: 31
End: 2021-08-05

## 2021-08-05 PROCEDURE — 86832 HLA CLASS I HIGH DEFIN QUAL: CPT | Performed by: SURGERY

## 2021-08-05 PROCEDURE — 86833 HLA CLASS II HIGH DEFIN QUAL: CPT | Performed by: SURGERY

## 2021-08-09 ENCOUNTER — TELEPHONE (OUTPATIENT)
Dept: TRANSPLANT | Facility: CLINIC | Age: 31
End: 2021-08-09

## 2021-08-09 NOTE — LETTER
August 10, 2021        [facility] KIDNEY PROGRAM-HEMODIALYSIS-[facility] (ESRD)  [address]  [place] [address]      RE:  Vinnie Jean Baptiste, 1990      This letter will serve as our request for your most recent nephrology progress notes, 2728 form, labs, and current dry weight for the above mutual patient.    Please fax to [phone], attn: Ellen Negron LPN at your earliest convenience.    Thank you in advance for your assistance in this matter.          Ellen Negron LPN -  Kidney Transplant Wait List  [phone] Phone  [phone] Fax  [email or web address]     [facility] Trans[facility]  [facility] [address] [place] [id number]

## 2021-08-09 NOTE — LETTER
SAMPLES ARE NEXT DUE:  11/10/2021 & EVERY 12 WEEKS THEREAFTER    PHYSICIAN ORDER   ALA/PRA BLOOD      DATE & TIME ISSUED: August 10, 2021  PATIENT NAME: Vinnie Jean Baptiste   : 1990     [facility] MR# [if applicable]: [id number]     DIAGNOSIS/ICD-10  CODE: Z76.82 Awaiting Organ Transplant, N18.6 ESRD    EXPIRES: (1 YEAR AFTER DATE ISSUED)  DRAW AND SEND SAMPLES EVERY 12 Weeks - Please include 2 patient identifiers on all tubes.      1. Please draw 20ml of blood in red top (plain) tube for Antileukocyte Antibody (ALA or PRA).   2. Label tubes with the patient s name, , and complete lab slip.       3. Mailers, lab slips with instructions are sent to patient separately.      4. Call the Outreach Lab at [phone] to reorder mailers.       5. Mail blood to (this address is also on the mailers):    [facility]  [address]  [address] [place] [address]  [id number]          Jackie Keenan M.D., FACS  Professor of Surgery

## 2021-08-09 NOTE — TELEPHONE ENCOUNTER
Pt is on dialysis TTS at [facility]  Phone [phone]  Fax [phone]  Neph: Dr. Angelo    Dialysis called asking for new HLA/PRA lab order and kits.

## 2021-08-12 ENCOUNTER — TRANSFERRED RECORDS (OUTPATIENT)
Dept: HEALTH INFORMATION MANAGEMENT | Facility: CLINIC | Age: 31
End: 2021-08-12

## 2021-08-24 ENCOUNTER — DOCUMENTATION ONLY (OUTPATIENT)
Dept: TRANSPLANT | Facility: CLINIC | Age: 31
End: 2021-08-24

## 2021-10-25 ENCOUNTER — TELEPHONE (OUTPATIENT)
Dept: TRANSPLANT | Facility: CLINIC | Age: 31
End: 2021-10-25

## 2021-10-25 NOTE — TELEPHONE ENCOUNTER
Called pt and left VM introducing self as new transplant coordinator. Attempted to complete wellness call. Left VM with direct line for return call. Will do wellness call at that time.

## 2021-11-01 ENCOUNTER — TELEPHONE (OUTPATIENT)
Dept: TRANSPLANT | Facility: CLINIC | Age: 31
End: 2021-11-01

## 2021-11-16 ENCOUNTER — TELEPHONE (OUTPATIENT)
Dept: TRANSPLANT | Facility: CLINIC | Age: 31
End: 2021-11-16
Payer: COMMERCIAL

## 2021-11-16 NOTE — TELEPHONE ENCOUNTER
Well-Check Questionnaire    November 16, 2021  Patient: Vinnie Jean Baptiste  Interviewer: Shell Luong RN       1. Have you been admitted to the hospital since we last saw you or had a recent ED visits? Yes, cellulitis infection in groin a few weeks ago all headed. Had to pack it for 1 week.   o If yes, what were you hospitalized for?  Yes hospitalized for 1 week.   o What facility were you admitted to?  [facility]   o When did this occur?  October 4-October 11, 2021  o Did you complete all the recommended follow-up testing and visits, if applicable? Yes  2. Have you had any surgeries or procedures since we last saw you?  Yes  o If yes, what procedures were completed? Had an incision and drainage of moderate sized abscess cavity in the right groin with extension to right mons pubis with  Penrose drain placement.   o What facility performed the procedure?  [facility]   o When did this occur?  Around October 7  o Did you complete all the recommended follow-up testing and visits, if applicable?  Yes, completed all follow ups   3. Do you or have you ever been told you have PVD and/or other vascular issues?  No  o If yes, what have you been diagnosed with?  N/A  o Who is the physician treating this issue?  N/A  4. Do you or have you ever been told you have Cardiac problems and/or issues?  No  o If yes, what have you been diagnosed with?  N/A  o Who is the physician treating this issue?  N/A  5. Do you or have you ever been told you have Pulmonary problems and/or Issues?  No  o If yes, what have you been diagnosed with?  N/A  o Who is the physician treating this issue?  N/A  6. Do you have any current medical issues that are requiring the monitoring of a physician, that you did not note above (examples: open or non-healing wounds, hematologic conditions, etc.)?  No  o If yes, what have you been diagnosed with?  N/A  o Who is the physician treating this issue?  N/A  7. Do you  currently use or take any of the following:  o Oxygen No  i. If yes, how many liters and how often do you use it?   o Midodrine (for hypotension)  No  i. If yes, what is your current dosage and how often used?  o Florinef (for hypotension)  No  i. If yes, what is your current dosage and how often used?    o Anticoagulation or anti-platelet medications including Coumadin/Warfarin, Plavix, Eliquis, Pradaxa, Brilinta, and/or Aspirin?  No  i. If you, what medication are you taking and indication for use?    o Antibiotic(s)  yes  i. If yes, what medication are you taking and indication for use?  Unasyn IV, Ancef IV, Vancomycin IV, Cefepime, and flagyl while hospitalized . Keflex upon discharge, completed course   8. Describe your functional status, your ability to walk around and care for yourself:  o How far can you walk without stopping (Example: 1-2 Blocks)?  No issues. Able to walk without difficulty.   o Do you have any limitations or use assistive devices such as a walker or cane? None   o Are you able to complete your Activities of Daily Living (ADL's) without         assistance? Yes  9. Are you currently on Dialysis?  Yes   o If yes, which type (Hemodialysis or Peritoneal)?  Hemodialysis   o What center do you attend?  [facility]   o Which days of the week do you do dialysis?  TT  10. Describe your current social situation:  o Where and with whom do you reside?  Live with mom, dad, brother, and niece   o What is your Post-Transplant Caregiver Plan?  mom  o What is your Post-Transplant Lodging Plan?  Home   11. What is your current Insurance coverage?  Blue cross blue shield   o Has this changed since you were last seen by us?  no  12. Do you have any questions or concerns related to your transplant listing/next steps in the transplant process?  No questions. Pt educated on the importance of notifying transplant office of hospitalizations and open wounds, as well as changes in medical status. Pt  verbalized understanding. Direct line left with pt should questions arise.

## 2021-12-28 ENCOUNTER — DOCUMENTATION ONLY (OUTPATIENT)
Dept: TRANSPLANT | Facility: CLINIC | Age: 31
End: 2021-12-28

## 2021-12-30 ENCOUNTER — LAB (OUTPATIENT)
Dept: LAB | Facility: CLINIC | Age: 31
End: 2021-12-30
Payer: COMMERCIAL

## 2021-12-30 DIAGNOSIS — Z76.82 AWAITING ORGAN TRANSPLANT: ICD-10-CM

## 2021-12-30 DIAGNOSIS — N18.6 ESRD (END STAGE RENAL DISEASE) (H): ICD-10-CM

## 2021-12-30 PROCEDURE — 36415 COLL VENOUS BLD VENIPUNCTURE: CPT

## 2021-12-30 PROCEDURE — 86832 HLA CLASS I HIGH DEFIN QUAL: CPT

## 2021-12-30 PROCEDURE — 86833 HLA CLASS II HIGH DEFIN QUAL: CPT

## 2022-01-06 LAB
PROTOCOL CUTOFF: NORMAL
SA 1 CELL: NORMAL
SA 1 TEST METHOD: NORMAL
SA 2 CELL: NORMAL
SA 2 TEST METHOD: NORMAL
SA1 HI RISK ABY: NORMAL
SA1 MOD RISK ABY: NORMAL
SA2 HI RISK ABY: NORMAL
SA2 MOD RISK ABY: NORMAL
UNACCEPTABLE ANTIGENS: NORMAL
UNOS CPRA: 77
ZZZSA 1  COMMENTS: NORMAL
ZZZSA 2 COMMENTS: NORMAL

## 2022-04-14 ENCOUNTER — DOCUMENTATION ONLY (OUTPATIENT)
Dept: TRANSPLANT | Facility: CLINIC | Age: 32
End: 2022-04-14

## 2022-04-23 ENCOUNTER — LAB (OUTPATIENT)
Dept: LAB | Facility: CLINIC | Age: 32
End: 2022-04-23
Payer: COMMERCIAL

## 2022-04-23 DIAGNOSIS — N18.6 ESRD (END STAGE RENAL DISEASE) (H): ICD-10-CM

## 2022-04-23 DIAGNOSIS — Z76.82 AWAITING ORGAN TRANSPLANT: ICD-10-CM

## 2022-04-23 PROCEDURE — 86832 HLA CLASS I HIGH DEFIN QUAL: CPT

## 2022-04-23 PROCEDURE — 86833 HLA CLASS II HIGH DEFIN QUAL: CPT

## 2022-07-02 ENCOUNTER — LAB (OUTPATIENT)
Dept: LAB | Facility: CLINIC | Age: 32
End: 2022-07-02

## 2022-07-02 DIAGNOSIS — N18.6 ESRD (END STAGE RENAL DISEASE) (H): ICD-10-CM

## 2022-07-02 DIAGNOSIS — Z76.82 ORGAN TRANSPLANT CANDIDATE: ICD-10-CM

## 2022-07-02 PROCEDURE — 86833 HLA CLASS II HIGH DEFIN QUAL: CPT | Performed by: PHYSICIAN ASSISTANT

## 2022-07-02 PROCEDURE — 86832 HLA CLASS I HIGH DEFIN QUAL: CPT | Performed by: PHYSICIAN ASSISTANT

## 2022-07-08 DIAGNOSIS — Z76.82 ORGAN TRANSPLANT CANDIDATE: ICD-10-CM

## 2022-07-08 DIAGNOSIS — N18.6 ESRD (END STAGE RENAL DISEASE) (H): ICD-10-CM

## 2022-07-20 LAB
PROTOCOL CUTOFF: NORMAL
SA 1 CELL: NORMAL
SA 1 TEST METHOD: NORMAL
SA 2 CELL: NORMAL
SA 2 TEST METHOD: NORMAL
SA1 HI RISK ABY: NORMAL
SA1 MOD RISK ABY: NORMAL
SA2 HI RISK ABY: NORMAL
SA2 MOD RISK ABY: NORMAL
UNACCEPTABLE ANTIGENS: NORMAL
UNOS CPRA: 81
ZZZSA 1  COMMENTS: NORMAL
ZZZSA 2 COMMENTS: NORMAL

## 2022-10-14 ENCOUNTER — LAB (OUTPATIENT)
Dept: LAB | Facility: CLINIC | Age: 32
End: 2022-10-14

## 2022-10-14 DIAGNOSIS — Z76.82 ORGAN TRANSPLANT CANDIDATE: ICD-10-CM

## 2022-10-14 DIAGNOSIS — N18.6 ESRD (END STAGE RENAL DISEASE) (H): ICD-10-CM

## 2022-10-14 PROCEDURE — 86833 HLA CLASS II HIGH DEFIN QUAL: CPT

## 2022-10-14 PROCEDURE — 86832 HLA CLASS I HIGH DEFIN QUAL: CPT

## 2022-10-18 DIAGNOSIS — N18.6 ESRD (END STAGE RENAL DISEASE) (H): ICD-10-CM

## 2022-10-18 DIAGNOSIS — Z76.82 ORGAN TRANSPLANT CANDIDATE: ICD-10-CM

## 2022-11-02 ENCOUNTER — TELEPHONE (OUTPATIENT)
Dept: TRANSPLANT | Facility: CLINIC | Age: 32
End: 2022-11-02

## 2022-11-23 ENCOUNTER — TELEPHONE (OUTPATIENT)
Dept: TRANSPLANT | Facility: CLINIC | Age: 32
End: 2022-11-23

## 2022-12-08 ENCOUNTER — TELEPHONE (OUTPATIENT)
Dept: TRANSPLANT | Facility: CLINIC | Age: 32
End: 2022-12-08

## 2022-12-08 NOTE — TELEPHONE ENCOUNTER
Email sent to be requesting him to contact writer regarding A2 to B titers and update that RWL appts are due.

## 2022-12-15 ENCOUNTER — TELEPHONE (OUTPATIENT)
Dept: TRANSPLANT | Facility: CLINIC | Age: 32
End: 2022-12-15

## 2022-12-15 NOTE — TELEPHONE ENCOUNTER
Called HD center to get message to pt to call writer back. Pt runs MWF. HD staff will get message to pt to call writer back.

## 2022-12-16 ENCOUNTER — TELEPHONE (OUTPATIENT)
Dept: TRANSPLANT | Facility: CLINIC | Age: 32
End: 2022-12-16

## 2022-12-16 NOTE — TELEPHONE ENCOUNTER
Called pt to update due for RWL and discuss A2 to B transplants. Left VM with direct line for return call.

## 2022-12-19 ENCOUNTER — TELEPHONE (OUTPATIENT)
Dept: TRANSPLANT | Facility: CLINIC | Age: 32
End: 2022-12-19

## 2022-12-19 NOTE — TELEPHONE ENCOUNTER
Called pts cellphone, no answer.     Called HD center to contact pt and discuss RWL and A2 to B. Pt hasn't arrived at HD yet, unsure if pt is coming today. Updated that pt has been made inactive as transplant office has been trying to contact him for over a month with no response. Direct line left for pt to contact writer and discuss status on the transplant list.     12/19/2022 Verified Kidney list status as INACTIVE (unable to contact) in [employer] and Epic.  Valerie Lenz RN   I saw the patient last week to follow up on vomiting. Please check in with the patient's mom or dad and see if she's doing better. Please get an updated weight on her

## 2022-12-20 ENCOUNTER — TELEPHONE (OUTPATIENT)
Dept: TRANSPLANT | Facility: CLINIC | Age: 32
End: 2022-12-20

## 2022-12-20 NOTE — TELEPHONE ENCOUNTER
"Called [facility] to speak with SW. Left  with direct line for return call.     Email sent to  center [facility] to get update on compliance.      12/20/22 addendum: Received email back from  Christina GRACE:     \"Has missed 1 tx in December, 1 tx in November. Over the past 6 months, he has missed a total of 10 tx; I believe he had some legal issues during the summer that contributed to missed tx (not totally sure about this). Overall, during the past 2 months, is missing less tx than he did during summer months. He continues to work fulltime. When I see him, I'll remind him to call you.\"     Will request HD attendance records, will update visit with  once pt contacts writer.       "

## 2022-12-21 ENCOUNTER — DOCUMENTATION ONLY (OUTPATIENT)
Dept: TRANSPLANT | Facility: CLINIC | Age: 32
End: 2022-12-21

## 2022-12-21 ENCOUNTER — COMMITTEE REVIEW (OUTPATIENT)
Dept: TRANSPLANT | Facility: CLINIC | Age: 32
End: 2022-12-21

## 2022-12-21 NOTE — COMMITTEE REVIEW
Abdominal Patient Discussion Note Transplant Coordinator: Shell Luong  Transplant Surgeon:       Referring Physician: Joel Rivera    Committee Review Members:  Nephrology Noel Erickson MD, Dandy Mccoy, APRN CNP, Danny Stokes MD   Nutrition Deborah Sosa, OMAR   Pharmacist Tato Langford, [facility]    - Clinical Colleencarlos manuel Martinez, [facility], Shell Melchor, [facility]   Transplant Valerie Lenz RN, Hussein Jay MD, Jaz Lancaster, DELMIS, Shell Luong, DELMIS, Momo Grace, DELMIS, Stormy Eisenberg, DELMIS, Lora Dawson, DELMIS       Additional Discussion Notes and Findings: Pt to remain inactive due to inability to contact pt. Pt will need to contact coordinator and come in for RWL visits prior to active status consideration.

## 2022-12-22 ENCOUNTER — DOCUMENTATION ONLY (OUTPATIENT)
Dept: TRANSPLANT | Facility: CLINIC | Age: 32
End: 2022-12-22

## 2022-12-22 ENCOUNTER — TRANSFERRED RECORDS (OUTPATIENT)
Dept: HEALTH INFORMATION MANAGEMENT | Facility: CLINIC | Age: 32
End: 2022-12-22

## 2022-12-23 ENCOUNTER — DOCUMENTATION ONLY (OUTPATIENT)
Dept: TRANSPLANT | Facility: CLINIC | Age: 32
End: 2022-12-23

## 2023-01-02 ENCOUNTER — TELEPHONE (OUTPATIENT)
Dept: TRANSPLANT | Facility: CLINIC | Age: 33
End: 2023-01-02

## 2023-01-03 ENCOUNTER — DOCUMENTATION ONLY (OUTPATIENT)
Dept: TRANSPLANT | Facility: CLINIC | Age: 33
End: 2023-01-03

## 2023-01-11 ENCOUNTER — LAB (OUTPATIENT)
Dept: LAB | Facility: CLINIC | Age: 33
End: 2023-01-11

## 2023-01-11 DIAGNOSIS — Z76.82 ORGAN TRANSPLANT CANDIDATE: ICD-10-CM

## 2023-01-11 DIAGNOSIS — N18.6 ESRD (END STAGE RENAL DISEASE) (H): ICD-10-CM

## 2023-01-11 PROCEDURE — 86832 HLA CLASS I HIGH DEFIN QUAL: CPT | Performed by: SURGERY

## 2023-01-11 PROCEDURE — 86833 HLA CLASS II HIGH DEFIN QUAL: CPT | Performed by: SURGERY

## 2023-01-16 ENCOUNTER — TELEPHONE (OUTPATIENT)
Dept: TRANSPLANT | Facility: CLINIC | Age: 33
End: 2023-01-16
Payer: COMMERCIAL

## 2023-01-16 DIAGNOSIS — Z01.810 PRE-OPERATIVE CARDIOVASCULAR EXAMINATION: ICD-10-CM

## 2023-01-16 DIAGNOSIS — Z76.82 ORGAN TRANSPLANT CANDIDATE: ICD-10-CM

## 2023-01-16 DIAGNOSIS — N18.6 ESRD (END STAGE RENAL DISEASE) (H): ICD-10-CM

## 2023-01-16 NOTE — TELEPHONE ENCOUNTER
Received call from pt. Discussed transplant office was trying to reach pt for a few months and pt did not respond. 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 Reviewed pt should still contact transplant office and leave message if he is not connected with someone. Pt will try to be better about answering calls. Reviewed pt is due for RWL. Orders placed for neph, SW, transplant surgeon, lab (PRA and A2 titers).

## 2023-01-23 NOTE — TELEPHONE ENCOUNTER
Patient called to schedule KWL appts (surg, CHEIKH, SW & labs); he confirmed for appts on Tues, Match 7, 2023 and emailed schedule to him.

## 2023-03-02 NOTE — PROGRESS NOTES
TRANSPLANT NEPHROLOGY WAITLIST VISIT    Assessment and Plan:  # Kidney Transplant Wait List Evaluation: Patient is a good candidate overall. Patient should be active pending   -  Support  by social work and local providers.   - stress ECHO.     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     # Cardiac Risk: outside 2022 ECHO with normal LVEF, grade II diastolic dysfunction with elevated BNP in the setting of fluid overload/respiratory infection. Will get stress ECHO.      # Noncompliance, Poor Communication: team was unable to reach patient for which he was made inactive last December. Patent agreeable to return calls more promptly moving forward. Appreciate social work, dialysis unit and local provider input.     # Health Maintenance: Dental: Not up to date     Discussed the risks and benefits of a transplant, including the risk of surgery and immunosuppression medications.    KDPI: We discussed approximate remaining wait time and how that is influenced by issues such as blood type and sensitization (PRA) and access to a living donor. I contrasted potential waiting time for living vs  donor kidneys from  normal (0-85%) or higher (%) kidney donor profile index (KDPI) donors and their associated outcomes. I would not recommend Mr. Jean Baptiste to consider kidneys from high KDPI donors. The reason for this decision is best summarized as: improved long term graft survival.    Patient presently appears to be enough of an acceptable kidney transplant recipient candidate to have any potential kidney donors start the evaluation process.  Patient s overall re-evaluation may require further discussion in the Transplant Program s multidisciplinary selection committee for a final recommendation on the patient s suitability for transplant.     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     Date of Initial Transplant Evaluation:  9/2019         Current Transplant Phase: Waitlist: Inactive  Official UNOS Listing Date: 9/25/2019  Blood Type: B        cPRA: 81%       Date of cPRA: 1/2023  Transplant Coordinator: Shell Luong Transplant Office phone number [phone]       History of Present Illness:   Vinnie Jean Baptiste is a 33-year-old male with history of ESKD 2/2 to IgA nephropathy, on HD since 5/2020, made inactive last December as team were unable to reach him by phone, now here for consideration of active status.            Interim Events:   - Started HD in 5/2020    - Admits    10/2021: septic from right groin cellulitis and abscess s/p I&D. Infection resolved.     6/2022: respiratory infection/ fluid overload. BNP elevated, top neg. COVID neg. Received lasix and full course of abx with vanco cefepime metronidazole and Zithromax. Blood cultures neg. Recovered OK and back to baseline.                   Kidney Disease:        Kidney Disease Dx: IgA nephropathy       On Dialysis: Yes, Date initiated: 5/2020 and Dialysis Type: [facility]; still making small amounts of urine. Working with Dr. Angelo on med adjustments for BPs, has been running high.        Primary Nephrologist: Dr. Angelo          Cardiac/Vascular Disease History:       Known CAD: No       Known PAD/Claudication Symptoms: No       Known Heart Failure:  outside 6/2022 ECHO with normal LVEF, grade II diastolic dysfunction with elevated BNP in the setting of fluid overload/respiratory infection.        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 Requires pushing/pulling heavy weight for which he does not have chest pain or shortness of breath.         Allergy Testing Questions:   Medication that caused a  reaction None   Antibiotics used that didn't give an allergic reaction?  None    COVID Vaccination Up To Date: Yes , but due for booster             Other Pertinent Transplant Surgical Issues:  Recent Blood Transfusion: No  Previous Abdominal Transplant: No  Bladder Dysfunction: No  Chronic/Recurrent Infections: No  Chronic Anticoagulation: No  Jehovah s Witness: No       Active Problem List:   Patient Active Problem List   Diagnosis     Hypertension     IgA nephropathy     CKD (chronic kidney disease) stage 5, GFR less than 15 ml/min (H)     Anemia in chronic kidney disease (CKD)       Personal History:  Former smoker      Allergies:  Allergies   Allergen Reactions     Blood Transfusion Related (Informational Only) Other (See Comments)     Patient has a history of a clinically significant antibody against RBC antigens.  A delay in compatible RBCs may occur.        Medications:  Current Outpatient Medications   Medication Sig     acetaminophen (TYLENOL) 325 MG tablet Take 650 mg by mouth     amLODIPine (NORVASC) 5 MG tablet      calcitRIOL (ROCALTROL) 0.25 MCG capsule      ferrous gluconate (FERGON) 324 (38 Fe) MG tablet TAKE 1 TABLET BY MOUTH TWICE DAILY WITH FOOD     furosemide (LASIX) 20 MG tablet      metoprolol tartrate (LOPRESSOR) 25 MG tablet      sodium bicarbonate 650 MG tablet      No current facility-administered medications for this visit.        Vitals:  There were no vitals taken for this visit.     Exam:  GENERAL APPEARANCE: alert and no distress  HENT: mouth without ulcers or lesions  LYMPHATICS: no cervical or supraclavicular nodes  RESP: lungs clear to auscultation - no rales, rhonchi or wheezes  CV: regular rhythm, normal rate, no rub, no murmur  FEMORAL PULSES: normal  EDEMA: no LE edema bilaterally  ABDOMEN: soft, nondistended, nontender, bowel sounds normal  MS: extremities normal - no gross deformities noted, no evidence of inflammation in joints, no muscle tenderness  SKIN: no rash

## 2023-03-07 ENCOUNTER — ALLIED HEALTH/NURSE VISIT (OUTPATIENT)
Dept: TRANSPLANT | Facility: CLINIC | Age: 33
End: 2023-03-07
Attending: NURSE PRACTITIONER
Payer: COMMERCIAL

## 2023-03-07 ENCOUNTER — OFFICE VISIT (OUTPATIENT)
Dept: TRANSPLANT | Facility: CLINIC | Age: 33
End: 2023-03-07
Attending: NURSE PRACTITIONER
Payer: MEDICARE

## 2023-03-07 ENCOUNTER — OFFICE VISIT (OUTPATIENT)
Dept: NEPHROLOGY | Facility: CLINIC | Age: 33
End: 2023-03-07
Attending: NURSE PRACTITIONER
Payer: COMMERCIAL

## 2023-03-07 ENCOUNTER — LAB (OUTPATIENT)
Dept: LAB | Facility: CLINIC | Age: 33
End: 2023-03-07
Attending: NURSE PRACTITIONER
Payer: MEDICARE

## 2023-03-07 VITALS
HEART RATE: 96 BPM | TEMPERATURE: 98.4 F | WEIGHT: 153.5 LBS | OXYGEN SATURATION: 98 % | BODY MASS INDEX: 23.34 KG/M2 | DIASTOLIC BLOOD PRESSURE: 98 MMHG | SYSTOLIC BLOOD PRESSURE: 174 MMHG

## 2023-03-07 VITALS
OXYGEN SATURATION: 98 % | HEART RATE: 96 BPM | TEMPERATURE: 98.4 F | WEIGHT: 153.44 LBS | DIASTOLIC BLOOD PRESSURE: 98 MMHG | BODY MASS INDEX: 23.33 KG/M2 | SYSTOLIC BLOOD PRESSURE: 174 MMHG

## 2023-03-07 DIAGNOSIS — N18.6 ESRD (END STAGE RENAL DISEASE) (H): ICD-10-CM

## 2023-03-07 DIAGNOSIS — Z01.810 PRE-OPERATIVE CARDIOVASCULAR EXAMINATION: ICD-10-CM

## 2023-03-07 DIAGNOSIS — E87.20 METABOLIC ACIDOSIS: ICD-10-CM

## 2023-03-07 DIAGNOSIS — Z76.82 ORGAN TRANSPLANT CANDIDATE: ICD-10-CM

## 2023-03-07 DIAGNOSIS — Z76.82 ORGAN TRANSPLANT CANDIDATE: Primary | ICD-10-CM

## 2023-03-07 DIAGNOSIS — N18.6 ESRD (END STAGE RENAL DISEASE) (H): Primary | ICD-10-CM

## 2023-03-07 DIAGNOSIS — N25.81 SECONDARY RENAL HYPERPARATHYROIDISM (H): ICD-10-CM

## 2023-03-07 LAB
A1 AB TITR SERPL: 8 {TITER}
A1 AB TITR SERPL: 8 {TITER}
A2 AB TITR SERPL: 1 {TITER}
A2 AB TITR SERPL: <1 {TITER}
ANTIBODY SCREEN: NEGATIVE
ANTIBODY TITER IGM SCREEN: NEGATIVE
SPECIMEN EXPIRATION DATE: NORMAL
SPECIMEN EXPIRATION DATE: NORMAL

## 2023-03-07 PROCEDURE — 99207 PR NO CHARGE COORDINATED CARE PS: CPT

## 2023-03-07 PROCEDURE — 99000 SPECIMEN HANDLING OFFICE-LAB: CPT | Performed by: PATHOLOGY

## 2023-03-07 PROCEDURE — 86832 HLA CLASS I HIGH DEFIN QUAL: CPT | Performed by: PATHOLOGY

## 2023-03-07 PROCEDURE — 86886 COOMBS TEST INDIRECT TITER: CPT | Performed by: PATHOLOGY

## 2023-03-07 PROCEDURE — 99204 OFFICE O/P NEW MOD 45 MIN: CPT | Performed by: NURSE PRACTITIONER

## 2023-03-07 PROCEDURE — 36415 COLL VENOUS BLD VENIPUNCTURE: CPT | Performed by: PATHOLOGY

## 2023-03-07 PROCEDURE — 99214 OFFICE O/P EST MOD 30 MIN: CPT | Mod: 27 | Performed by: NURSE PRACTITIONER

## 2023-03-07 PROCEDURE — 86833 HLA CLASS II HIGH DEFIN QUAL: CPT | Performed by: PATHOLOGY

## 2023-03-07 PROCEDURE — 86850 RBC ANTIBODY SCREEN: CPT | Performed by: PATHOLOGY

## 2023-03-07 PROCEDURE — 99212 OFFICE O/P EST SF 10 MIN: CPT | Performed by: SURGERY

## 2023-03-07 PROCEDURE — 99215 OFFICE O/P EST HI 40 MIN: CPT | Performed by: SURGERY

## 2023-03-07 RX ORDER — ALLOPURINOL 100 MG/1
100 TABLET ORAL
COMMUNITY
Start: 2022-06-09

## 2023-03-07 RX ORDER — SUCROFERRIC OXYHYDROXIDE 500 MG/1
1000 TABLET, CHEWABLE ORAL
COMMUNITY
Start: 2022-10-11

## 2023-03-07 RX ORDER — LISINOPRIL 20 MG/1
20 TABLET ORAL DAILY
COMMUNITY
End: 2024-05-16

## 2023-03-07 ASSESSMENT — PAIN SCALES - GENERAL: PAINLEVEL: NO PAIN (0)

## 2023-03-07 NOTE — NURSING NOTE
Chief Complaint   Patient presents with     RECHECK       BP (!) 174/98   Pulse 96   Temp 98.4  F (36.9  C) (Oral)   Wt 69.6 kg (153 lb 8 oz)   SpO2 98%   BMI 23.34 kg/m      Devan Bell on 3/7/2023 at 1:37 PM

## 2023-03-07 NOTE — PROGRESS NOTES
"Patient Name: Vinnie Jean Baptiste  : 1990  Age: 33 year old  MRN: [id number]  Date of Initial Social Work Evaluation: 2019    Patient on transplant wait list.  Saw today to update psychosocial assessment.      Presenting Information   Living Situation: [address]   [place] [address]    Vinnie lives with his parents, brother, niece and sister in law  If not local, plans for short term stay:  Plan to stay locally. Vinnie is aware that [place] [facility] [place].   Previous Functional Status: Independent ADLs   Cultural/Language/Spiritual Considerations: None     Support System  Primary Support Person Mother and Father   Other support:  Brother, sister in law   Plan for support in immediate post-transplant period: Parents were present at [facility] [place]. Parents are aware of the expectations of the caregiver role post transplant.     Health Care Directive  Decision Maker: Self   Alternate Decision Maker: NOK-parents   Health Care Directive: Provided education    Mental Health/Coping:   History of Mental Health: No mental health concerns. Vinnie reports no history of MH concerns.  History of Chemical Health: Vinnie reports that he previously used alcohol and tobacco but quit. He has no current CD concerns.   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 Employer is aware of time off needed.   Insurance and medication coverage: Holds BCBS through employer. SW provided education on ESRD Medicare. Vinnie has been on dialysis <2 years, is eligible for this.   Financial concerns: None   Resources needed: NA     Assessment and recommendations and plan:  Reviewed transplant education (Medicare, rehabilitation, donor issues, community/financial resources, and psych/family " "adjustment) as well as psychosocial risks of transplant. Provided patient with a copy of post-transplant informational sheet that includes information on potential costs of medications, Medicare ESRD, post-transplant lodging, etc. Patient seemed to process information well. Appeared well informed, motivated, and able to follow post transplant requirements. Behavior was appropriate during interview. Has adequate income and insurance coverage. Adequate social support. No major contraindications noted for transplant. At this time, patient appears to understand the risks and benefits of transplant.     There were concerns with lost follow up [id number]. Per chart review, Vinnie was not attending appointments at this time due to feeling \"physically healthy.\"     Per current HD center: \"Has missed 1 tx in December, 1 tx in November. Over the past 6 months, he has missed a total of 10 tx; I believe he had some legal issues during the summer that contributed to missed tx (not totally sure about this). Overall, during the past 2 months, is missing less tx than he did during summer months. He continues to work fulltime. When I see him, I'll remind him to call you.\"     In early 2023, Vinnie was increasingly difficult to get a ahold of. The transplant office was trying to reach him for a few months with no response. He notes that he works in a loud area so can't always hear his phone. His RNCC has told him pt should still contact transplant office and leave message if he is not connected with someone.    In today's visit, SW pressed concerns with compliance. There appears to be a consistent trend in compliance related issues for the last 8-9 years. The largest issue being that Vinnie is difficult to get a hold of. SW reported that he may get a call at any time and needs to be immediately responsive. LESLY also noted that this is extremely important in terms of being notified of appointments/medical updates. CHEIKH Webster also discussed " compliance concerns. If these concerns do not significantly improve, it is fair to remove patient from our transplant list. Patients must be responsive to their transplant team. There were multiple occassions of Vinnie not responding or returning calls for several weeks at a time- this is a very large concern. If Vinnie is unable to appropriately communicate with the team, he will not be an appropriate candidate for transplant. He reports that his work is willing to work with him and he should have access to his cell phone while working. SW will continue to follow for psychosocial support, resources and advocate on behalf of the patient.    Shell Melchor   [facility]  Outpatient Kidney/Pancreas/Auto Islet Transplant Program   [address] [place] [address] [place] [id number]  [email or web address]  [email or web address]  Office: [phone] I Fax: [phone]

## 2023-03-07 NOTE — LETTER
3/7/2023         RE: Vinnie Jean Baptiste  [address]  [place] [address]        Dear Colleague,    Thank you for referring your patient, Vinnie Jean Baptiste, to the [facility]PL[facility]. Please see a copy of my visit note below.    Transplant Surgery Consult Note     Medical record number: [id number]  YOB: 1990,   Consult requested  for evaluation of kidney transplant candidacy.    Assessment and Recommendations:Mr. Jean Baptiste appears to be a good candidate for kidney transplantation and has a good understanding of the risks and benefits of this approach to the management of renal failure. The following issues should be addressed prior to finalizing his transplant candidacy:     Transplant order: Mr. Jean Baptiste has End stage renal failure due to IgA nephropathy whose condition is not expected to resolve, is expected to progress, and is expected to continue to develop related comorbid conditions.  Recommend he be considered as a candidate for kidney transplant.  Cardiology consult for cardiac risk stratification to be ordered: No.  If abnormal stress echo, will have him follow up with cardiology  CT abdomen and pelvis without contrast to be ordered for assessment of vascular targets: No  Transplant listing labs ordered to include HLA, ABOx2, Cr, etc.  Dietician consult ordered: Yes  Social work consult ordered: Yes  Imaging reports reviewed:  CT A/P 5/2019   Unremarkable CT  Radiology images reviewed: Will obtain images from 5/2019 CT and review  Recipient suitable to move forward with work up of living donors:  Yes  Does have ab to blood that is unidentified. Will need to do xmatch for blood before and will need some extra time  Appreciate SW input on compliance  Discussed being available and returning phone call from coordinators and other transplant team members  Please obtain images from 2019 CT A/P. If abnormal, may request repeat but unlikely given his age  Discussed the need to notify us of  any blood transfusions      The majority of our visit was spent in counselling, discussing the medical and surgical risks of kidney transplantation. We discussed approximate wait time and how that is influenced by issues such as blood type and sensitization (PRA) and access to a living donor. I contrasted potential waiting time for living vs  donor kidneys from  normal (0-85%) or higher (%) kidney donor profile index (KDPI) donors and their associated outcomes. I would not recommend this individual to consider kidneys from high KDPI donors. The reason for this decision is best summarized as: improved long term graft survival. Potential surgical complications of kidney transplantation include bleeding, superficial or deep wound complications (infection, hernia, lymphocele), ureteral anastomotic failure (leak or stenosis), graft thrombosis, need for reoperation and other issues such as cardiac complications, pneumonia, deep venous thrombosis, pulmonary embolism, post transplant diabetes and death. The potential for recurrent disease or need for retransplantation was also addressed. We discussed the possible need for ureteral stent (and subsequent removal), and the utility of protocol biopsy and laboratory studies to evaluate for rejection or recurrent disease. We discussed the risk of graft rejection, our center's average graft and patient survival rates, immunosuppression protocols, as well as the potential opportunity to participate in clinical trials.  We also discussed the average length of stay, recovery process, and posttransplant lab and monitoring protocol.  I emphasized the need for strict immunosuppression medication adherence and the potential for complications of immunosuppression such as skin cancer or lymphoma, as well as a very low but not zero risk of donor-derived disease transmission risks (infection, cancer). Mr. Jean Baptiste asked good questions and his candidacy will be reviewed at our  Multidisciplinary Selection Committee. Thank you for the opportunity to participate in Mr. Jean Baptiste's care.      Total time: 60 minutes        Melody Bustamante MD FACS  Assistant Professor of Surgery  Director, Living Kidney Donor Program.    ---------------------------------------------------------------------------------------------------    HPI: Mr. Jean Baptiste has End stage renal failure due to IgA nephropathy. The patient is non-diabetic.       The patient is on dialysis.    Has potential kidney donors:  Yes .  Interested in participation in paired exchange if a donor is willing: Doesn't know     The patient has the following pertinent history:       No    Yes  Dialysis:    []      [x] via: HD L UE AVF      Blood Transfusion                  []      [x]  Number of units:   Most recently: 2019  Pregnancy:    [x]      [] Number:       Previous Transplant:  [x]      [] Details:    Cancer    [x]      [] Comment:   Kidney stones   [x]      [] Comment:      Recurrent infections  [x]      []  Type:                  Bladder dysfunction  [x]      [] Cause:    Claudication   [x]      [] Distance:    Previous Amputation  [x]      [] Cause:     Chronic anticoagulation  [x]      [] Indication:   Rastafari  [x]      []      Past Medical History:   Diagnosis Date     Anemia in chronic kidney disease (CKD)      CKD (chronic kidney disease) stage 5, GFR less than 15 ml/min (H)      Duodenal ulcer      H. pylori infection      Hematuria      History of blood transfusion      Hypertension      IgA nephropathy      Past Surgical History:   Procedure Laterality Date     BIOPSY      Kidney Biopsy [facility] >10 Years Ago      Family History   Problem Relation Age of Onset     Kidney Disease Mother      Social History     Socioeconomic History     Marital status:      Spouse name: Not on file     Number of children: Not on file     Years of education: Not on file     Highest education level: Not on file    Occupational History     Not on file   Tobacco Use     Smoking status: Former     Types: Cigarettes     Smokeless tobacco: Never     Tobacco comments:     Quit Smoking 5/5/2019   Substance and Sexual Activity     Alcohol use: Not Currently     Drug use: Not Currently     Sexual activity: Not on file   Other Topics Concern     Parent/sibling w/ CABG, MI or angioplasty before 65F 55M? Not Asked   Social History Narrative     Not on file     Social Determinants of Health     Financial Resource Strain: Not on file   Food Insecurity: Not on file   Transportation Needs: Not on file   Physical Activity: Not on file   Stress: Not on file   Social Connections: Not on file   Intimate Partner Violence: Not on file   Housing Stability: Not on file       ROS:   CONSTITUTIONAL:  No fevers or chills  EYES: negative for icterus  ENT:  negative for hearing loss, tinnitus and sore throat  RESPIRATORY:  negative for cough, sputum, dyspnea  CARDIOVASCULAR:  negative for chest pain Fatigue  GASTROINTESTINAL:  negative for nausea, vomiting, diarrhea or constipation  GENITOURINARY:  negative for incontinence, dysuria, bladder emptying problems  HEME:  No easy bruising  INTEGUMENT:  negative for rash and pruritus  NEURO:  Negative for headache, seizure disorder  Allergies:   Allergies   Allergen Reactions     Blood Transfusion Related (Informational Only) Other (See Comments)     Patient has a history of a clinically significant antibody against RBC antigens.  A delay in compatible RBCs may occur.     Medications:  Prescription Medications as of 3/7/2023       Rx Number Disp Refills Start End Last Dispensed Date Next Fill Date Owning Pharmacy    acetaminophen (TYLENOL) 325 MG tablet    5/8/2019        Sig: Take 650 mg by mouth    Class: Historical    Route: Oral    allopurinol (ZYLOPRIM) 100 MG tablet    6/9/2022        Sig: Take 100 mg by mouth    Class: Historical    Route: Oral    amLODIPine (NORVASC) 5 MG tablet    8/31/2019         Class: Historical    calcitRIOL (ROCALTROL) 0.25 MCG capsule    8/31/2019        Class: Historical    ferrous gluconate (FERGON) 324 (38 Fe) MG tablet    8/2/2019        Sig: TAKE 1 TABLET BY MOUTH TWICE DAILY WITH FOOD    Class: Historical    furosemide (LASIX) 20 MG tablet   11 8/19/2019        Class: Historical    lisinopril (ZESTRIL) 20 MG tablet            Sig: Take 20 mg by mouth daily    Class: Historical    Route: Oral    metoprolol tartrate (LOPRESSOR) 25 MG tablet    8/31/2019        Class: Historical    sodium bicarbonate 650 MG tablet    8/31/2019        Class: Historical    VELPHORO 500 MG CHEW chewable tablet    10/11/2022        Class: Historical        Exam:     There were no vitals taken for this visit.  Appearance: in no apparent distress.   Skin: normal  Eyes:  no redness or discharge.  Sclera anicteric  Head and Neck: Normal, no rashes or jaundice  Respiratory: easy respirations, no audible wheezing.  Abdomen: flat, No distention and No surgical scars   Extremities: femoral 3+/3+, Edema, trace, pitting bilateral  Neuro: without deficit   Psychiatric: Normal mood and affect    Diagnostics:   No results found for this or any previous visit (from the past 672 hour(s)).  UNOS cPRA   Date Value Ref Range Status   02/04/2021 77  Final     UNOS CPRA   Date Value Ref Range Status   01/11/2023 81  Final        3

## 2023-03-07 NOTE — PROGRESS NOTES
Transplant Surgery Consult Note     Medical record number: [id number]  YOB: 1990,   Consult requested  for evaluation of kidney transplant candidacy.    Assessment and Recommendations:Mr. Jean Baptiste appears to be a good candidate for kidney transplantation and has a good understanding of the risks and benefits of this approach to the management of renal failure. The following issues should be addressed prior to finalizing his transplant candidacy:     Transplant order: Mr. Jean Baptiste has End stage renal failure due to IgA nephropathy whose condition is not expected to resolve, is expected to progress, and is expected to continue to develop related comorbid conditions.  Recommend he be considered as a candidate for kidney transplant.  Cardiology consult for cardiac risk stratification to be ordered: No.  If abnormal stress echo, will have him follow up with cardiology  CT abdomen and pelvis without contrast to be ordered for assessment of vascular targets: No  Transplant listing labs ordered to include HLA, ABOx2, Cr, etc.  Dietician consult ordered: Yes  Social work consult ordered: Yes  Imaging reports reviewed:  CT A/P 5/2019   Unremarkable CT  Radiology images reviewed: Will obtain images from 5/2019 CT and review  Recipient suitable to move forward with work up of living donors:  Yes  Does have ab to blood that is unidentified. Will need to do xmatch for blood before and will need some extra time  Appreciate SW input on compliance  Discussed being available and returning phone call from coordinators and other transplant team members  Please obtain images from 2019 CT A/P. If abnormal, may request repeat but unlikely given his age  Discussed the need to notify us of any blood transfusions      The majority of our visit was spent in counselling, discussing the medical and surgical risks of kidney transplantation. We discussed approximate wait time and how that is influenced by issues such as blood type and  sensitization (PRA) and access to a living donor. I contrasted potential waiting time for living vs  donor kidneys from  normal (0-85%) or higher (%) kidney donor profile index (KDPI) donors and their associated outcomes. I would not recommend this individual to consider kidneys from high KDPI donors. The reason for this decision is best summarized as: improved long term graft survival. Potential surgical complications of kidney transplantation include bleeding, superficial or deep wound complications (infection, hernia, lymphocele), ureteral anastomotic failure (leak or stenosis), graft thrombosis, need for reoperation and other issues such as cardiac complications, pneumonia, deep venous thrombosis, pulmonary embolism, post transplant diabetes and death. The potential for recurrent disease or need for retransplantation was also addressed. We discussed the possible need for ureteral stent (and subsequent removal), and the utility of protocol biopsy and laboratory studies to evaluate for rejection or recurrent disease. We discussed the risk of graft rejection, our center's average graft and patient survival rates, immunosuppression protocols, as well as the potential opportunity to participate in clinical trials.  We also discussed the average length of stay, recovery process, and posttransplant lab and monitoring protocol.  I emphasized the need for strict immunosuppression medication adherence and the potential for complications of immunosuppression such as skin cancer or lymphoma, as well as a very low but not zero risk of donor-derived disease transmission risks (infection, cancer). Mr. Jean Baptiste asked good questions and his candidacy will be reviewed at our Multidisciplinary Selection Committee. Thank you for the opportunity to participate in Mr. Jean Baptiste's care.      Total time: 60 minutes        Melody Bustamante MD FACS  Assistant Professor of Surgery  Director, Living Kidney Donor  Program.    ---------------------------------------------------------------------------------------------------    HPI: Mr. Jean Baptiste has End stage renal failure due to IgA nephropathy. The patient is non-diabetic.       The patient is on dialysis.    Has potential kidney donors:  Yes .  Interested in participation in paired exchange if a donor is willing: Doesn't know     The patient has the following pertinent history:       No    Yes  Dialysis:    []      [x] via: HD L UE AVF      Blood Transfusion                  []      [x]  Number of units:   Most recently: 2019  Pregnancy:    [x]      [] Number:       Previous Transplant:  [x]      [] Details:    Cancer    [x]      [] Comment:   Kidney stones   [x]      [] Comment:      Recurrent infections  [x]      []  Type:                  Bladder dysfunction  [x]      [] Cause:    Claudication   [x]      [] Distance:    Previous Amputation  [x]      [] Cause:     Chronic anticoagulation  [x]      [] Indication:   Church  [x]      []      Past Medical History:   Diagnosis Date     Anemia in chronic kidney disease (CKD)      CKD (chronic kidney disease) stage 5, GFR less than 15 ml/min (H)      Duodenal ulcer      H. pylori infection      Hematuria      History of blood transfusion      Hypertension      IgA nephropathy      Past Surgical History:   Procedure Laterality Date     BIOPSY      Kidney Biopsy [facility] >10 Years Ago      Family History   Problem Relation Age of Onset     Kidney Disease Mother      Social History     Socioeconomic History     Marital status:      Spouse name: Not on file     Number of children: Not on file     Years of education: Not on file     Highest education level: Not on file   Occupational History     Not on file   Tobacco Use     Smoking status: Former     Types: Cigarettes     Smokeless tobacco: Never     Tobacco comments:     Quit Smoking 5/5/2019   Substance and Sexual Activity     Alcohol use: Not Currently      Drug use: Not Currently     Sexual activity: Not on file   Other Topics Concern     Parent/sibling w/ CABG, MI or angioplasty before 65F 55M? Not Asked   Social History Narrative     Not on file     Social Determinants of Health     Financial Resource Strain: Not on file   Food Insecurity: Not on file   Transportation Needs: Not on file   Physical Activity: Not on file   Stress: Not on file   Social Connections: Not on file   Intimate Partner Violence: Not on file   Housing Stability: Not on file       ROS:   CONSTITUTIONAL:  No fevers or chills  EYES: negative for icterus  ENT:  negative for hearing loss, tinnitus and sore throat  RESPIRATORY:  negative for cough, sputum, dyspnea  CARDIOVASCULAR:  negative for chest pain Fatigue  GASTROINTESTINAL:  negative for nausea, vomiting, diarrhea or constipation  GENITOURINARY:  negative for incontinence, dysuria, bladder emptying problems  HEME:  No easy bruising  INTEGUMENT:  negative for rash and pruritus  NEURO:  Negative for headache, seizure disorder  Allergies:   Allergies   Allergen Reactions     Blood Transfusion Related (Informational Only) Other (See Comments)     Patient has a history of a clinically significant antibody against RBC antigens.  A delay in compatible RBCs may occur.     Medications:  Prescription Medications as of 3/7/2023       Rx Number Disp Refills Start End Last Dispensed Date Next Fill Date Owning Pharmacy    acetaminophen (TYLENOL) 325 MG tablet    5/8/2019        Sig: Take 650 mg by mouth    Class: Historical    Route: Oral    allopurinol (ZYLOPRIM) 100 MG tablet    6/9/2022        Sig: Take 100 mg by mouth    Class: Historical    Route: Oral    amLODIPine (NORVASC) 5 MG tablet    8/31/2019        Class: Historical    calcitRIOL (ROCALTROL) 0.25 MCG capsule    8/31/2019        Class: Historical    ferrous gluconate (FERGON) 324 (38 Fe) MG tablet    8/2/2019        Sig: TAKE 1 TABLET BY MOUTH TWICE DAILY WITH FOOD    Class: Historical     furosemide (LASIX) 20 MG tablet   11 8/19/2019        Class: Historical    lisinopril (ZESTRIL) 20 MG tablet            Sig: Take 20 mg by mouth daily    Class: Historical    Route: Oral    metoprolol tartrate (LOPRESSOR) 25 MG tablet    8/31/2019        Class: Historical    sodium bicarbonate 650 MG tablet    8/31/2019        Class: Historical    VELPHORO 500 MG CHEW chewable tablet    10/11/2022        Class: Historical        Exam:     There were no vitals taken for this visit.  Appearance: in no apparent distress.   Skin: normal  Eyes:  no redness or discharge.  Sclera anicteric  Head and Neck: Normal, no rashes or jaundice  Respiratory: easy respirations, no audible wheezing.  Abdomen: flat, No distention and No surgical scars   Extremities: femoral 3+/3+, Edema, trace, pitting bilateral  Neuro: without deficit   Psychiatric: Normal mood and affect    Diagnostics:   No results found for this or any previous visit (from the past 672 hour(s)).  UNOS cPRA   Date Value Ref Range Status   02/04/2021 77  Final     UNOS CPRA   Date Value Ref Range Status   01/11/2023 81  Final

## 2023-03-07 NOTE — LETTER
3/7/2023       RE: Vinnie Jean Baptiste  [address]  [place] [address]     Dear Colleague,    Thank you for referring your patient, Vinnie Jean Baptiste, to the [facility] [place] at [facility]. Please see a copy of my visit note below.    TRANSPLANT NEPHROLOGY WAITLIST VISIT    Assessment and Plan:  # Kidney Transplant Wait List Evaluation: Patient is a good candidate overall. Patient should be active pending   -  Support  by social work and local providers.   - stress ECHO.     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     # Cardiac Risk: outside 2022 ECHO with normal LVEF, grade II diastolic dysfunction with elevated BNP in the setting of fluid overload/respiratory infection. Will get stress ECHO.      # Noncompliance, Poor Communication: team was unable to reach patient for which he was made inactive last December. Patent agreeable to return calls more promptly moving forward. Appreciate social work, dialysis unit and local provider input.     # Health Maintenance: Dental: Not up to date     Discussed the risks and benefits of a transplant, including the risk of surgery and immunosuppression medications.    KDPI: We discussed approximate remaining wait time and how that is influenced by issues such as blood type and sensitization (PRA) and access to a living donor. I contrasted potential waiting time for living vs  donor kidneys from  normal (0-85%) or higher (%) kidney donor profile index (KDPI) donors and their associated outcomes. I would not recommend Mr. Jean Baptiste to consider kidneys from high KDPI donors. The reason for this decision is best summarized as: improved long term graft survival.    Patient presently appears to be enough of an acceptable kidney transplant recipient candidate to have any potential kidney donors start the evaluation  process.  Patient s overall re-evaluation may require further discussion in the Transplant Program s multidisciplinary selection committee for a final recommendation on the patient s suitability for transplant.     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     Date of Initial Transplant Evaluation:  9/2019         Current Transplant Phase: Waitlist: Inactive  Official UNOS Listing Date: 9/25/2019  Blood Type: B        cPRA: 81%       Date of cPRA: 1/2023  Transplant Coordinator: Shell Luong Transplant Office phone number [phone]       History of Present Illness:   Vinnie Jean Baptiste is a 33-year-old male with history of ESKD 2/2 to IgA nephropathy, on HD since 5/2020, made inactive last December as team were unable to reach him by phone, now here for consideration of active status.            Interim Events:   - Started HD in 5/2020    - Admits    10/2021: septic from right groin cellulitis and abscess s/p I&D. Infection resolved.     6/2022: respiratory infection/ fluid overload. BNP elevated, top neg. COVID neg. Received lasix and full course of abx with vanco cefepime metronidazole and Zithromax. Blood cultures neg. Recovered OK and back to baseline.                   Kidney Disease:        Kidney Disease Dx: IgA nephropathy       On Dialysis: Yes, Date initiated: 5/2020 and Dialysis Type: [facility] HD; still making small amounts of urine. Working with Dr. Angelo on med adjustments for BPs, has been running high.        Primary Nephrologist: Dr. Angelo          Cardiac/Vascular Disease History:       Known CAD: No       Known PAD/Claudication Symptoms: No       Known Heart Failure:  outside 6/2022 ECHO with normal LVEF, grade II diastolic dysfunction with elevated BNP in the setting of fluid overload/respiratory infection.        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 Requires pushing/pulling heavy weight for which he does not have chest pain or shortness of breath.         Allergy Testing Questions:   Medication that caused a reaction None   Antibiotics used that didn't give an allergic reaction?  None    COVID Vaccination Up To Date: Yes , but due for booster             Other Pertinent Transplant Surgical Issues:  Recent Blood Transfusion: No  Previous Abdominal Transplant: No  Bladder Dysfunction: No  Chronic/Recurrent Infections: No  Chronic Anticoagulation: No  Jehovah s Witness: No       Active Problem List:   Patient Active Problem List   Diagnosis     Hypertension     IgA nephropathy     CKD (chronic kidney disease) stage 5, GFR less than 15 ml/min (H)     Anemia in chronic kidney disease (CKD)       Personal History:  Former smoker      Allergies:  Allergies   Allergen Reactions     Blood Transfusion Related (Informational Only) Other (See Comments)     Patient has a history of a clinically significant antibody against RBC antigens.  A delay in compatible RBCs may occur.        Medications:  Current Outpatient Medications   Medication Sig     acetaminophen (TYLENOL) 325 MG tablet Take 650 mg by mouth     amLODIPine (NORVASC) 5 MG tablet      calcitRIOL (ROCALTROL) 0.25 MCG capsule      ferrous gluconate (FERGON) 324 (38 Fe) MG tablet TAKE 1 TABLET BY MOUTH TWICE DAILY WITH FOOD     furosemide (LASIX) 20 MG tablet      metoprolol tartrate (LOPRESSOR) 25 MG tablet      sodium bicarbonate 650 MG tablet      No current facility-administered medications for this visit.        Vitals:  There were no vitals taken for this visit.     Exam:  GENERAL APPEARANCE: alert and no distress  HENT: mouth without ulcers or lesions  LYMPHATICS: no cervical or supraclavicular nodes  RESP: lungs clear to auscultation - no rales, rhonchi or  wheezes  CV: regular rhythm, normal rate, no rub, no murmur  FEMORAL PULSES: normal  EDEMA: no LE edema bilaterally  ABDOMEN: soft, nondistended, nontender, bowel sounds normal  MS: extremities normal - no gross deformities noted, no evidence of inflammation in joints, no muscle tenderness  SKIN: no rash          Again, thank you for allowing me to participate in the care of your patient.      Sincerely,    ERICKA Hill CNP

## 2023-03-09 LAB
PROTOCOL CUTOFF: NORMAL
SA 1 CELL: NORMAL
SA 1 TEST METHOD: NORMAL
SA 2 CELL: NORMAL
SA 2 TEST METHOD: NORMAL
SA1 HI RISK ABY: NORMAL
SA1 MOD RISK ABY: NORMAL
SA2 HI RISK ABY: NORMAL
SA2 MOD RISK ABY: NORMAL
UNACCEPTABLE ANTIGENS: NORMAL
UNOS CPRA: 80
ZZZSA 1  COMMENTS: NORMAL
ZZZSA 2 COMMENTS: NORMAL

## 2023-04-14 ENCOUNTER — TELEPHONE (OUTPATIENT)
Dept: CARDIOLOGY | Facility: CLINIC | Age: 33
End: 2023-04-14
Payer: COMMERCIAL

## 2023-04-14 ENCOUNTER — TELEPHONE (OUTPATIENT)
Dept: TRANSPLANT | Facility: CLINIC | Age: 33
End: 2023-04-14
Payer: COMMERCIAL

## 2023-04-14 NOTE — TELEPHONE ENCOUNTER
Patient Call: General  Route to LPN    Reason for call: pt says he had a missed call from coordinator and is requesting  Call back     Call back needed? Yes    Return Call Needed  Same as documented in contacts section  When to return call?: Greater than one day: Route standard priority

## 2023-04-14 NOTE — TELEPHONE ENCOUNTER
Returned call to pt to check. Pt waiting for a call from scheduling on stress testing. Reviewed pt will need repeat A2 titers in June. Waiting on CT images from [facility]. Pt verbalized understanding of information and has no further questions. Encouraged to reach out if questions arise.

## 2023-04-14 NOTE — TELEPHONE ENCOUNTER
[facility] Call Center    Phone Message    May a detailed message be left on voicemail: yes     Reason for Call: Other: Patient was calling to schedule his Echocardiogram Dobutamine Stress appointment at [place]. Please call patient back to assist with scheduling, thank you!     Action Taken: Message routed to:  Other: Cardiology    Travel Screening: Not Applicable

## 2023-06-08 DIAGNOSIS — N18.6 ESRD (END STAGE RENAL DISEASE) (H): ICD-10-CM

## 2023-06-08 DIAGNOSIS — Z76.82 ORGAN TRANSPLANT CANDIDATE: ICD-10-CM

## 2023-06-08 NOTE — PROGRESS NOTES
Called pt to update A2 titers due in June. Lab appt and orders placed. Also appears pt has not completed stress echo. Needs to be scheduled. Left VM with direct line for return call.

## 2023-06-13 ENCOUNTER — TEAM CONFERENCE (OUTPATIENT)
Dept: TRANSPLANT | Facility: CLINIC | Age: 33
End: 2023-06-13
Payer: COMMERCIAL

## 2023-06-13 NOTE — TELEPHONE ENCOUNTER
Image Review Meeting    ATTENDEES: Dr. Bustamante and coordinators    IMAGES REVIEWED: 5/6/2019 CT a/p     DECISION: Vessels suitable for transplant, should have updated CT in 1 year.

## 2023-08-31 ENCOUNTER — TELEPHONE (OUTPATIENT)
Dept: TRANSPLANT | Facility: CLINIC | Age: 33
End: 2023-08-31
Payer: COMMERCIAL

## 2023-08-31 NOTE — TELEPHONE ENCOUNTER
Called pt to check in on lab (A2 titers) and stress test needed for transplant status. Pt reports has not gotten things scheduled as he was told by dialysis that he needs his parathyroid removed. Reports referral was placed but did not hear from anyone to schedule. RNCC will call HD center to check in. Pt is overwhelmed with visits right now would like  to hold on Labs and stress test until parathyroid removed. Pt will update writer when visit is scheduled.     Called HD center and spoke with DELMIS [place]. Reviewed pt reports referral in place for parathyroid removal. RN reports he would need to look into but can discuss with pt when he comes in next.

## 2023-10-09 ENCOUNTER — TELEPHONE (OUTPATIENT)
Dept: TRANSPLANT | Facility: CLINIC | Age: 33
End: 2023-10-09
Payer: COMMERCIAL

## 2023-10-09 NOTE — TELEPHONE ENCOUNTER
Called pt to check in on endocrine visit. Pt reports he did some labs but didn't get a call to schedule. He will let RNCC know once visit is scheduled. Pt verbalized understanding of information and has no further questions. Encouraged to reach out if questions arise.

## 2023-11-17 ENCOUNTER — TELEPHONE (OUTPATIENT)
Dept: TRANSPLANT | Facility: CLINIC | Age: 33
End: 2023-11-17
Payer: COMMERCIAL

## 2024-02-26 ENCOUNTER — TELEPHONE (OUTPATIENT)
Dept: TRANSPLANT | Facility: CLINIC | Age: 34
End: 2024-02-26
Payer: COMMERCIAL

## 2024-03-26 ENCOUNTER — DOCUMENTATION ONLY (OUTPATIENT)
Dept: TRANSPLANT | Facility: CLINIC | Age: 34
End: 2024-03-26
Payer: COMMERCIAL

## 2024-03-26 ENCOUNTER — TELEPHONE (OUTPATIENT)
Dept: TRANSPLANT | Facility: CLINIC | Age: 34
End: 2024-03-26
Payer: COMMERCIAL

## 2024-03-26 NOTE — TELEPHONE ENCOUNTER
Called HD center to touch base on pt status post parathyroidectomy. Spoke with RN, compliant with treatments coming every day. Calcium levels have been all over the place, concern for non compliance with meds. BP elevated. Will request labs and neph notes.

## 2024-03-29 ENCOUNTER — TELEPHONE (OUTPATIENT)
Dept: TRANSPLANT | Facility: CLINIC | Age: 34
End: 2024-03-29
Payer: COMMERCIAL

## 2024-03-29 DIAGNOSIS — Z76.82 ORGAN TRANSPLANT CANDIDATE: ICD-10-CM

## 2024-03-29 DIAGNOSIS — Z01.810 PRE-OPERATIVE CARDIOVASCULAR EXAMINATION: ICD-10-CM

## 2024-03-29 DIAGNOSIS — N18.6 ESRD (END STAGE RENAL DISEASE) (H): Primary | ICD-10-CM

## 2024-03-29 NOTE — TELEPHONE ENCOUNTER
Called pt to update orders placed for neph, SW, lab (A2 titers), and DSE. Scheduling will be reaching out to arrange. Pt verbalized understanding of information and has no further questions. Encouraged to reach out if questions arise.

## 2024-04-02 ENCOUNTER — DOCUMENTATION ONLY (OUTPATIENT)
Dept: TRANSPLANT | Facility: CLINIC | Age: 34
End: 2024-04-02
Payer: COMMERCIAL

## 2024-05-16 ENCOUNTER — HOSPITAL ENCOUNTER (OUTPATIENT)
Dept: CARDIOLOGY | Facility: CLINIC | Age: 34
Discharge: HOME | End: 2024-05-16
Attending: NURSE PRACTITIONER
Payer: COMMERCIAL

## 2024-05-16 ENCOUNTER — HOSPITAL ENCOUNTER (OUTPATIENT)
Dept: CT IMAGING | Facility: CLINIC | Age: 34
Discharge: HOME | End: 2024-05-16
Attending: NURSE PRACTITIONER
Payer: COMMERCIAL

## 2024-05-16 ENCOUNTER — OFFICE VISIT (OUTPATIENT)
Dept: TRANSPLANT | Facility: CLINIC | Age: 34
End: 2024-05-16
Attending: NURSE PRACTITIONER
Payer: COMMERCIAL

## 2024-05-16 ENCOUNTER — LAB (OUTPATIENT)
Dept: LAB | Facility: CLINIC | Age: 34
End: 2024-05-16
Attending: NURSE PRACTITIONER
Payer: COMMERCIAL

## 2024-05-16 VITALS
SYSTOLIC BLOOD PRESSURE: 170 MMHG | HEART RATE: 90 BPM | HEIGHT: 69 IN | DIASTOLIC BLOOD PRESSURE: 98 MMHG | BODY MASS INDEX: 24.01 KG/M2 | WEIGHT: 162.1 LBS | OXYGEN SATURATION: 99 %

## 2024-05-16 VITALS
DIASTOLIC BLOOD PRESSURE: 101 MMHG | RESPIRATION RATE: 16 BRPM | SYSTOLIC BLOOD PRESSURE: 159 MMHG | OXYGEN SATURATION: 100 % | HEART RATE: 82 BPM

## 2024-05-16 DIAGNOSIS — Z76.82 ORGAN TRANSPLANT CANDIDATE: ICD-10-CM

## 2024-05-16 DIAGNOSIS — Z90.89 S/P PARATHYROIDECTOMY: ICD-10-CM

## 2024-05-16 DIAGNOSIS — N28.9 RENAL LESION: ICD-10-CM

## 2024-05-16 DIAGNOSIS — Z01.810 PRE-OPERATIVE CARDIOVASCULAR EXAMINATION: ICD-10-CM

## 2024-05-16 DIAGNOSIS — N18.6 ESRD (END STAGE RENAL DISEASE) (H): ICD-10-CM

## 2024-05-16 DIAGNOSIS — E83.51 HYPOCALCEMIA: ICD-10-CM

## 2024-05-16 DIAGNOSIS — N18.6 ESRD (END STAGE RENAL DISEASE) (H): Primary | ICD-10-CM

## 2024-05-16 DIAGNOSIS — Z98.890 S/P PARATHYROIDECTOMY: ICD-10-CM

## 2024-05-16 LAB
A1 AB TITR SERPL: 16 {TITER}
A1 AB TITR SERPL: 32 {TITER}
A2 AB TITR SERPL: 4 {TITER}
A2 AB TITR SERPL: 4 {TITER}
ANTIBODY TITER IGM SCREEN: NEGATIVE
SPECIMEN EXPIRATION DATE: NORMAL

## 2024-05-16 PROCEDURE — 36415 COLL VENOUS BLD VENIPUNCTURE: CPT

## 2024-05-16 PROCEDURE — 93350 STRESS TTE ONLY: CPT | Mod: 26 | Performed by: INTERNAL MEDICINE

## 2024-05-16 PROCEDURE — 255N000002 HC RX 255 OP 636: Performed by: INTERNAL MEDICINE

## 2024-05-16 PROCEDURE — 93325 DOPPLER ECHO COLOR FLOW MAPG: CPT | Mod: 26 | Performed by: INTERNAL MEDICINE

## 2024-05-16 PROCEDURE — 93321 DOPPLER ECHO F-UP/LMTD STD: CPT | Mod: 26 | Performed by: INTERNAL MEDICINE

## 2024-05-16 PROCEDURE — 74176 CT ABD & PELVIS W/O CONTRAST: CPT | Mod: 26 | Performed by: STUDENT IN AN ORGANIZED HEALTH CARE EDUCATION/TRAINING PROGRAM

## 2024-05-16 PROCEDURE — 99214 OFFICE O/P EST MOD 30 MIN: CPT | Performed by: NURSE PRACTITIONER

## 2024-05-16 PROCEDURE — 250N000011 HC RX IP 250 OP 636: Performed by: INTERNAL MEDICINE

## 2024-05-16 PROCEDURE — 93016 CV STRESS TEST SUPVJ ONLY: CPT | Performed by: INTERNAL MEDICINE

## 2024-05-16 PROCEDURE — 250N000011 HC RX IP 250 OP 636: Mod: JZ | Performed by: INTERNAL MEDICINE

## 2024-05-16 PROCEDURE — 250N000009 HC RX 250: Performed by: INTERNAL MEDICINE

## 2024-05-16 PROCEDURE — 86886 COOMBS TEST INDIRECT TITER: CPT

## 2024-05-16 PROCEDURE — 93018 CV STRESS TEST I&R ONLY: CPT | Performed by: INTERNAL MEDICINE

## 2024-05-16 PROCEDURE — 99213 OFFICE O/P EST LOW 20 MIN: CPT | Mod: 25 | Performed by: NURSE PRACTITIONER

## 2024-05-16 PROCEDURE — 86850 RBC ANTIBODY SCREEN: CPT

## 2024-05-16 PROCEDURE — 74176 CT ABD & PELVIS W/O CONTRAST: CPT

## 2024-05-16 RX ORDER — SODIUM CHLORIDE 9 MG/ML
INJECTION, SOLUTION INTRAVENOUS CONTINUOUS
Status: DISCONTINUED | OUTPATIENT
Start: 2024-05-16 | End: 2024-05-16 | Stop reason: HOSPADM

## 2024-05-16 RX ORDER — HYDRALAZINE HYDROCHLORIDE 20 MG/ML
10 INJECTION INTRAMUSCULAR; INTRAVENOUS ONCE
Status: COMPLETED | OUTPATIENT
Start: 2024-05-16 | End: 2024-05-16

## 2024-05-16 RX ORDER — DOBUTAMINE HYDROCHLORIDE 200 MG/100ML
10-50 INJECTION INTRAVENOUS CONTINUOUS
Status: DISCONTINUED | OUTPATIENT
Start: 2024-05-16 | End: 2024-05-16 | Stop reason: HOSPADM

## 2024-05-16 RX ORDER — CALCIUM CARBONATE 500 MG/1
2500 TABLET, CHEWABLE ORAL 4 TIMES DAILY
COMMUNITY
Start: 2024-02-15

## 2024-05-16 RX ORDER — METOPROLOL TARTRATE 50 MG
100 TABLET ORAL 2 TIMES DAILY
COMMUNITY

## 2024-05-16 RX ORDER — METOPROLOL TARTRATE 1 MG/ML
1-20 INJECTION, SOLUTION INTRAVENOUS
Status: DISCONTINUED | OUTPATIENT
Start: 2024-05-16 | End: 2024-05-16 | Stop reason: HOSPADM

## 2024-05-16 RX ORDER — ATROPINE SULFATE 0.4 MG/ML
.2-2 AMPUL (ML) INJECTION
Status: COMPLETED | OUTPATIENT
Start: 2024-05-16 | End: 2024-05-16

## 2024-05-16 RX ADMIN — METOPROLOL TARTRATE 7 MG: 5 INJECTION INTRAVENOUS at 11:43

## 2024-05-16 RX ADMIN — HYDRALAZINE HYDROCHLORIDE 10 MG: 20 INJECTION INTRAMUSCULAR; INTRAVENOUS at 10:59

## 2024-05-16 RX ADMIN — ATROPINE SULFATE 2 MG: 0.4 INJECTION, SOLUTION INTRAVENOUS at 11:36

## 2024-05-16 RX ADMIN — DOBUTAMINE IN DEXTROSE 10 MCG/KG/MIN: 200 INJECTION, SOLUTION INTRAVENOUS at 11:21

## 2024-05-16 RX ADMIN — PERFLUTREN 3.5 ML: 6.52 INJECTION, SUSPENSION INTRAVENOUS at 11:41

## 2024-05-16 NOTE — PROGRESS NOTES
Patient Name: Vinnie Jean Baptiste  : 1990  Age: 34 year old  MRN: [id number]  Date of Initial Social Work Evaluation: 2019    Patient on kidney transplant wait list.  Saw today to update psychosocial assessment.      Presenting Information   Living Situation: Vinnie resides in [place], [place] with his parents, brother, niece and sister-in-law.   If not local, plans for short term stay:  Vinnie will travel from residence for appointments post transplant.   Previous Functional Status: Independent ADL's, drives self  Cultural/Language/Spiritual Considerations: None    Support System  Primary Support Person: Mother and Father  Other support:  Josr (Brother)  Plan for support in immediate post-transplant period: Vinnie will reside at home with family post-transplant.     Health Care Directive  Decision Maker: Self  Alternate Decision Maker: Josr   Health Care Directive: Provided education and Health Care Directive form.    Mental Health/Coping:   History of Mental Health: No mental health concerns at this time. Vinnie reports no history of mental health concerns.  History of Chemical Health: Vinnie reports history of smoking a pack of cigarettes daily. Vinnie reports quitting smoking  to Summer 2023.  Current status:  Vinnie began smoking cigarettes again Summer 2023. Vinnie reports currently smoking 3 packs of cigarettes a week. Vinnie reports rare use of alcohol.   Coping: Appropriate  Services Needed/Recommended: None identified at this time.     Financial   Income: Vinnie works full-time at a [employer] making [employer].   Impact of transplant on income: Time off work for transplant.   Insurance and medication coverage: BCBS and Medicare part A  Financial concerns: None identified at this time.  Resources needed: None identified at this time.     Assessment and recommendations and plan:  Reviewed transplant education (Medicare, rehabilitation, donor issues, community/financial resources, and psych/family adjustment) as well as  "psychosocial risks of transplant. Provided patient with a copy of post-transplant informational sheet that includes information on potential costs of medications, Medicare ESRD, post-transplant lodging, etc. Patient seemed to process information well. Appeared well informed, motivated, and able to follow post transplant requirements. Behavior was appropriate during interview. Has adequate income and insurance coverage. Adequate social support. No major contraindications noted for transplant. At this time, patient appears to understand the risks and benefits of transplant.      SW inquired about compliance with providers recommendations and dialysis. Vinnie reports past feelings and thoughts of feeling \"not liking being told what to do by authorities\" but feels has improved in compliance. Vinnie reported he did not attend dialysis once this month due to traveling to [place] for a family  but has been keeping up with dialysis and has not missed any other dialysis appointments this month.  SW emphasized the importance of compliance with providers instructions and dialysis. Writer provided psycho education on Health Care Directive and form.        Amber Lacey   [facility]  Outpatient Kidney/Pancreas/Auto Islet Transplant Program   [address] [place] [address] [place] [id number]  [email or web address]  [email or web address]  Office: [phone] I Fax: [phone]    "

## 2024-05-16 NOTE — PROGRESS NOTES
Pt here for dobutamine stress test.  Test, meds and side effects reviewed with patient.  Achieved target HR at 50 mcg Dobutamine and a total of 2mg IV atropine.  Gave a total of 7 mg IV Metoprolol to bring HR back to baseline.  Post monitoring complete and VSS.  Pt escorted out to the gold waiting room.    Test completed within 6 beats of max heart rate.     10mg IV Hydralazine given before stress test for HTN 170s/102.   Pt. Took his am metoprolol, no doses held.

## 2024-05-16 NOTE — PROGRESS NOTES
TRANSPLANT NEPHROLOGY WAITLIST VISIT    Assessment and Plan:  # Kidney Transplant Wait List Evaluation: Patient is a good candidate overall. Patient should be considered for active status after the following:    - renal US  - social work input on compliance     # ESKD from IgA nephropathy: although doing OK on HD since 2020, he would likely benefit from a kidney transplant.     # Cardiac Risk: stress ECHO today no inducible ischemia, LVEF 55-60%.     # Noncompliance, Poor communication with transplant team: made inactive in  due to being unreachable.  Appears to no longer be an issue.  Appreciate social work input.     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 Managing with Tums 5 tablets 4 times a day per report,  local neph also giving calcium gluconate as needed. Calcium levels ~  7s-8s in recent weeks.     # Indeterminate left renal mass: noted on CT imaging today. Will need renal US.     # Health Maintenance: Dental: Not up to date     Discussed the risks and benefits of a transplant, including the risk of surgery and immunosuppression medications.    KDPI: We discussed approximate remaining wait time and how that is influenced by issues such as blood type and sensitization (PRA) and access to a living donor. I contrasted potential waiting time for living vs  donor kidneys from  normal (0-85%) or higher (%) kidney donor profile index (KDPI) donors and their associated outcomes. I would not recommend Mr. Jean Baptiste to consider kidneys from high KDPI donors. The reason for this decision is best summarized as: improved long term graft survival.    Patient presently appears to be enough of an acceptable kidney transplant recipient candidate to have any potential kidney donors start the evaluation process.  Patient s overall re-evaluation may require further discussion in the Transplant Program s multidisciplinary selection committee for a final recommendation on the patient s  suitability for transplant.     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     Date of Initial Transplant Evaluation:  9/2019         Current Transplant Phase: Waitlist: Inactive  Official UNOS Listing Date: 9/25/2019  Blood Type: B        cPRA: 81%       Date of cPRA: 1/2023  Transplant Coordinator: Shell Luong Transplant Office phone number [phone]       History of Present Illness:   Vinnie Jean Baptiste is a 34-year-old male with history of ESKD 2/2 to IgA nephropathy, on HD since 5/2020, who presents for wait list evaluation.          Interim Events:   S/p parathyroidectomy on 2/2/24. C/b hungry bone syndrome. Managing with Tums 5 tablets 4 times a day per report,  local neph also giving calcium gluconate as needed. Calcium levels ~  7s-8s in outside labs in recent weeks. Inter dialytic fluid gains also appear to be a consistent issue per last neph note. He is anuric, SBPs reportedly average  145-170  mmHg at dialysis.                 Kidney Disease:        Kidney Disease Dx: IgA nephropathy       On Dialysis: Yes, Date initiated: 5/2020 and Dialysis Type: [facility];        Primary Nephrologist: Dr. Centeno          Cardiac/Vascular Disease History:       Known CAD: No, stress ECHO today no inducible ischemia, LVEF 55-60%.        Known PAD/Claudication Symptoms: No       Known Heart Failure:  outside 6/2022 ECHO with normal LVEF, grade II diastolic dysfunction with elevated BNP in the setting of fluid overload/respiratory infection.        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 Requires pushing/pulling heavy weight for which he does not have chest pain or shortness of breath.         Allergy Testing  Questions:   Medication that caused a reaction None   Antibiotics used that didn't give an allergic reaction?  None    COVID Vaccination Up To Date: Yes          Other Pertinent Transplant Surgical Issues:  Recent Blood Transfusion: No  Previous Abdominal Transplant: No  Bladder Dysfunction: No  Chronic/Recurrent Infections: No  Chronic Anticoagulation: No  Jehovah s Witness: No       Active Problem List:   Patient Active Problem List   Diagnosis    Hypertension    IgA nephropathy    End stage kidney disease (H)    Anemia in chronic kidney disease (CKD)    Secondary renal hyperparathyroidism (H24)    Metabolic acidosis       Personal History:  Former smoker      Allergies:  Allergies   Allergen Reactions    Blood Transfusion Related (Informational Only) Other (See Comments)     Patient has a history of a clinically significant antibody against RBC antigens.  A delay in compatible RBCs may occur.    Iron Dextran      IV iron. Vision blurry, difficulty breathing, sweating. Required benedryl        Medications:  Current Outpatient Medications   Medication Sig Dispense Refill    acetaminophen (TYLENOL) 325 MG tablet Take 650 mg by mouth      allopurinol (ZYLOPRIM) 100 MG tablet Take 100 mg by mouth      amLODIPine (NORVASC) 5 MG tablet       calcitRIOL (ROCALTROL) 0.25 MCG capsule  (Patient not taking: Reported on 3/7/2023)      ferrous gluconate (FERGON) 324 (38 Fe) MG tablet TAKE 1 TABLET BY MOUTH TWICE DAILY WITH FOOD      furosemide (LASIX) 20 MG tablet   11    lisinopril (ZESTRIL) 20 MG tablet Take 20 mg by mouth daily      metoprolol tartrate (LOPRESSOR) 25 MG tablet  (Patient not taking: Reported on 3/7/2023)      sodium bicarbonate 650 MG tablet  (Patient not taking: Reported on 3/7/2023)      VELPHORO 500 MG CHEW chewable tablet        No current facility-administered medications for this visit.        Vitals:  There were no vitals taken for this visit.     Exam:  GENERAL APPEARANCE: alert and no  distress  HENT: mouth without ulcers or lesions  LYMPHATICS: no cervical or supraclavicular nodes  RESP: lungs clear to auscultation - no rales, rhonchi or wheezes  CV: regular rhythm, normal rate, no rub, no murmur  FEMORAL PULSES: normal  EDEMA: no LE edema bilaterally  ABDOMEN: soft, nondistended, nontender, bowel sounds normal  MS: extremities normal - no gross deformities noted, no evidence of inflammation in joints, no muscle tenderness  SKIN: no rash

## 2024-05-16 NOTE — LETTER
2024         RE: Vinnie Jean Baptiste  [address]  [place] [address]        Dear Colleague,    Thank you for referring your patient, Vinnie Jean Baptiste, to the [facility]. Please see a copy of my visit note below.    TRANSPLANT NEPHROLOGY WAITLIST VISIT    Assessment and Plan:  # Kidney Transplant Wait List Evaluation: Patient is a good candidate overall. Patient should be considered for active status after the following:    - renal US  - social work input on compliance     # ESKD from IgA nephropathy: although doing OK on HD since 2020, he would likely benefit from a kidney transplant.     # Cardiac Risk: stress ECHO today no inducible ischemia, LVEF 55-60%.     # Noncompliance, Poor communication with transplant team: made inactive in  due to being unreachable.  Appears to no longer be an issue.  Appreciate social work input.     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 Managing with Tums 5 tablets 4 times a day per report,  local neph also giving calcium gluconate as needed. Calcium levels ~  7s-8s in recent weeks.     # Indeterminate left renal mass: noted on CT imaging today. Will need renal US.     # Health Maintenance: Dental: Not up to date     Discussed the risks and benefits of a transplant, including the risk of surgery and immunosuppression medications.    KDPI: We discussed approximate remaining wait time and how that is influenced by issues such as blood type and sensitization (PRA) and access to a living donor. I contrasted potential waiting time for living vs  donor kidneys from  normal (0-85%) or higher (%) kidney donor profile index (KDPI) donors and their associated outcomes. I would not recommend Mr. Jean Baptiste to consider kidneys from high KDPI donors. The reason for this decision is best summarized as: improved long term graft survival.    Patient presently appears to be enough of an acceptable kidney transplant recipient candidate  to have any potential kidney donors start the evaluation process.  Patient s overall re-evaluation may require further discussion in the Transplant Program s multidisciplinary selection committee for a final recommendation on the patient s suitability for transplant.     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     Date of Initial Transplant Evaluation:  9/2019         Current Transplant Phase: Waitlist: Inactive  Official UNOS Listing Date: 9/25/2019  Blood Type: B        cPRA: 81%       Date of cPRA: 1/2023  Transplant Coordinator: Shell Luong Transplant Office phone number [phone]       History of Present Illness:   Vinnie Jean Baptiste is a 34-year-old male with history of ESKD 2/2 to IgA nephropathy, on HD since 5/2020, who presents for wait list evaluation.          Interim Events:   S/p parathyroidectomy on 2/2/24. C/b hungry bone syndrome. Managing with Tums 5 tablets 4 times a day per report,  local neph also giving calcium gluconate as needed. Calcium levels ~  7s-8s in outside labs in recent weeks. Inter dialytic fluid gains also appear to be a consistent issue per last neph note. He is anuric, SBPs reportedly average  145-170  mmHg at dialysis.                 Kidney Disease:        Kidney Disease Dx: IgA nephropathy       On Dialysis: Yes, Date initiated: 5/2020 and Dialysis Type: [facility] [place] [facility];        Primary Nephrologist: Dr. Centeno          Cardiac/Vascular Disease History:       Known CAD: No, stress ECHO today no inducible ischemia, LVEF 55-60%.        Known PAD/Claudication Symptoms: No       Known Heart Failure:  outside 6/2022 ECHO with normal LVEF, grade II diastolic dysfunction with elevated BNP in the setting of fluid overload/respiratory infection.        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         Allergy Testing Questions:   Medication that caused a reaction None   Antibiotics used that didn't give an allergic reaction?  None    COVID Vaccination Up To Date: Yes          Other Pertinent Transplant Surgical Issues:  Recent Blood Transfusion: No  Previous Abdominal Transplant: No  Bladder Dysfunction: No  Chronic/Recurrent Infections: No  Chronic Anticoagulation: No  Jehovah s Witness: No       Active Problem List:   Patient Active Problem List   Diagnosis     Hypertension     IgA nephropathy     End stage kidney disease (H)     Anemia in chronic kidney disease (CKD)     Secondary renal hyperparathyroidism (H24)     Metabolic acidosis       Personal History:  Former smoker      Allergies:  Allergies   Allergen Reactions     Blood Transfusion Related (Informational Only) Other (See Comments)     Patient has a history of a clinically significant antibody against RBC antigens.  A delay in compatible RBCs may occur.     Iron Dextran      IV iron. Vision blurry, difficulty breathing, sweating. Required benedryl        Medications:  Current Outpatient Medications   Medication Sig Dispense Refill     acetaminophen (TYLENOL) 325 MG tablet Take 650 mg by mouth       allopurinol (ZYLOPRIM) 100 MG tablet Take 100 mg by mouth       amLODIPine (NORVASC) 5 MG tablet        calcitRIOL (ROCALTROL) 0.25 MCG capsule  (Patient not taking: Reported on 3/7/2023)       ferrous gluconate (FERGON) 324 (38 Fe) MG tablet TAKE 1 TABLET BY MOUTH TWICE DAILY WITH FOOD       furosemide (LASIX) 20 MG tablet   11     lisinopril (ZESTRIL) 20 MG tablet Take 20 mg by mouth daily       metoprolol tartrate (LOPRESSOR) 25 MG tablet  (Patient not taking: Reported on 3/7/2023)       sodium bicarbonate 650 MG tablet  (Patient not taking:  Reported on 3/7/2023)       VELPHORO 500 MG CHEW chewable tablet        No current facility-administered medications for this visit.        Vitals:  There were no vitals taken for this visit.     Exam:  GENERAL APPEARANCE: alert and no distress  HENT: mouth without ulcers or lesions  LYMPHATICS: no cervical or supraclavicular nodes  RESP: lungs clear to auscultation - no rales, rhonchi or wheezes  CV: regular rhythm, normal rate, no rub, no murmur  FEMORAL PULSES: normal  EDEMA: no LE edema bilaterally  ABDOMEN: soft, nondistended, nontender, bowel sounds normal  MS: extremities normal - no gross deformities noted, no evidence of inflammation in joints, no muscle tenderness  SKIN: no rash        Again, thank you for allowing me to participate in the care of your patient.        Sincerely,        ERICKA Hill CNP

## 2024-05-17 ENCOUNTER — TELEPHONE (OUTPATIENT)
Dept: TRANSPLANT | Facility: CLINIC | Age: 34
End: 2024-05-17
Payer: COMMERCIAL

## 2024-05-17 LAB
ANTIBODY SCREEN: NEGATIVE
BLOOD BANK CHART COMMENT: NORMAL
SPECIMEN EXPIRATION DATE: NORMAL
SPECIMEN EXPIRATION DATE: NORMAL

## 2024-05-17 NOTE — TELEPHONE ENCOUNTER
[facility] Call Center    Phone Message    May a detailed message be left on voicemail: yes     Reason for Call: Other: Contacted Vinnie to schedule US. He is confused as to why the appt is needed as he was just in the clinic yesterday. Please contact patient to advise. Also, if needed, he would like to schedule in [place].      Action Taken: Message routed to:  [facility] ([facility]  [facility]    Travel Screening: Not Applicable

## 2024-05-20 NOTE — TELEPHONE ENCOUNTER
Returned call to pt. He would like to schedule Renal US in [place]. Offered imaging scheduling number, pt requested number be emailed as he is at work. Email sent. Pt will cheyanne to arrange. Reviewed A2 titers are now out of range but will recheck in August. Pt verbalized understanding of information and has no further questions. Encouraged to reach out if questions arise.

## 2024-05-21 ENCOUNTER — TEAM CONFERENCE (OUTPATIENT)
Dept: TRANSPLANT | Facility: CLINIC | Age: 34
End: 2024-05-21
Payer: COMMERCIAL

## 2024-05-21 NOTE — TELEPHONE ENCOUNTER
Image Review Meeting    ATTENDEES: Dr. Bustamante and coordinators     IMAGES REVIEWED: 5/16/24 CT a/p    DECISION: vessels suitable, renal US needed for indeterminate mass     INCIDENTALS: Yes: IMPRESSION:  1.  Possible mass at the superior left kidney is indeterminate.  Recommend ultrasound for further evaluation. Renal US ordered on 5/16/24  2.  No significant aortic or iliac calcifications.

## 2024-05-28 ENCOUNTER — HOSPITAL ENCOUNTER (OUTPATIENT)
Dept: ULTRASOUND IMAGING | Facility: CLINIC | Age: 34
Discharge: HOME | End: 2024-05-28
Attending: NURSE PRACTITIONER | Admitting: NURSE PRACTITIONER
Payer: COMMERCIAL

## 2024-05-28 DIAGNOSIS — N28.9 RENAL LESION: ICD-10-CM

## 2024-05-28 DIAGNOSIS — N18.6 ESRD (END STAGE RENAL DISEASE) (H): ICD-10-CM

## 2024-05-28 DIAGNOSIS — Z76.82 ORGAN TRANSPLANT CANDIDATE: ICD-10-CM

## 2024-05-28 PROCEDURE — 76770 US EXAM ABDO BACK WALL COMP: CPT

## 2024-05-29 DIAGNOSIS — N18.6 ESRD (END STAGE RENAL DISEASE) (H): ICD-10-CM

## 2024-05-29 DIAGNOSIS — Z76.82 ORGAN TRANSPLANT CANDIDATE: ICD-10-CM

## 2024-05-29 DIAGNOSIS — N28.9 RENAL LESION: Primary | ICD-10-CM

## 2024-05-29 LAB — RADIOLOGIST FLAGS: ABNORMAL

## 2024-05-30 ENCOUNTER — PATIENT OUTREACH (OUTPATIENT)
Dept: ONCOLOGY | Facility: CLINIC | Age: 34
End: 2024-05-30
Payer: COMMERCIAL

## 2024-05-30 DIAGNOSIS — N28.89 LEFT RENAL MASS: Primary | ICD-10-CM

## 2024-05-30 NOTE — PROGRESS NOTES
[facility]: Urology                                                                                    Writer reached out to patient on behalf of Dr. Abraham for new patient visit in regards too new Renal Mass noted on renal US. Writer spoke with patient regarding the need for MR renal prior to visit with Dr. Abraham.     Per Dr. Abraham he would like MR Renal ordered prior to visit and this too be done ASAP. Writer placed order on behalf of Dr. Abraham to be completed at [facility].     Patient aware of plan and had no further questions or concerns at this time.     Cuauhtemoc Alvares, RN, BSN.  RN Care Coordinator     [facility]   [phone]

## 2024-06-07 NOTE — TELEPHONE ENCOUNTER
MEDICAL RECORDS REQUEST   [facility]  Urology Clinic  [address] [place] [id number]  PHONE: [phone]  Fax: [phone]        FUTURE VISIT INFORMATION                                                   Vinnie Jean Baptiste, : 1990 scheduled for future visit at [facility]    APPOINTMENT INFORMATION:  Date: 2024  Provider:  Henny Abraham MD  Reason for Visit/Diagnosis: LEFT RENAL MASS    REFERRAL INFORMATION:  Referring provider:  Dandy Mccoy APRN CNP in [facility]      RECORDS REQUESTED FOR VISIT                                                     NOTES  STATUS/DETAILS   OFFICE NOTE from referring provider  yes, 2024 -- Dandy Mccoy APRN CNP in  [facility]   MEDICATION LIST  yes   LABS     URINALYSIS (UA)  no   IMAGES  YES, SCHEDULED FOR 2024 -- MR RENAL    2024 -- US RENAL  2024 -- CT ABD PELVIS     PRE-VISIT CHECKLIST      Joint diagnostic appointment coordinated correctly          (ensure right order & amount of time) Yes   RECORD COLLECTION COMPLETE Yes

## 2024-06-13 ENCOUNTER — HOSPITAL ENCOUNTER (OUTPATIENT)
Dept: MRI IMAGING | Facility: CLINIC | Age: 34
Discharge: HOME | End: 2024-06-13
Attending: UROLOGY | Admitting: UROLOGY
Payer: COMMERCIAL

## 2024-06-13 DIAGNOSIS — N28.89 LEFT RENAL MASS: ICD-10-CM

## 2024-06-13 PROCEDURE — 74183 MRI ABD W/O CNTR FLWD CNTR: CPT

## 2024-06-13 PROCEDURE — A9585 GADOBUTROL INJECTION: HCPCS | Mod: JZ | Performed by: UROLOGY

## 2024-06-13 PROCEDURE — 255N000002 HC RX 255 OP 636: Mod: JZ | Performed by: UROLOGY

## 2024-06-13 RX ORDER — GADOBUTROL 604.72 MG/ML
7.5 INJECTION INTRAVENOUS ONCE
Status: COMPLETED | OUTPATIENT
Start: 2024-06-13 | End: 2024-06-13

## 2024-06-13 RX ADMIN — GADOBUTROL 7 ML: 604.72 INJECTION INTRAVENOUS at 19:00

## 2024-06-19 ENCOUNTER — VIRTUAL VISIT (OUTPATIENT)
Dept: UROLOGY | Facility: CLINIC | Age: 34
End: 2024-06-19
Payer: COMMERCIAL

## 2024-06-19 ENCOUNTER — PRE VISIT (OUTPATIENT)
Dept: UROLOGY | Facility: CLINIC | Age: 34
End: 2024-06-19

## 2024-06-19 DIAGNOSIS — N28.9 RENAL LESION: ICD-10-CM

## 2024-06-19 PROCEDURE — 99204 OFFICE O/P NEW MOD 45 MIN: CPT | Mod: 95 | Performed by: UROLOGY

## 2024-06-19 RX ORDER — CHOLECALCIFEROL (VITAMIN D3) 50 MCG
1 TABLET ORAL EVERY MORNING
COMMUNITY
Start: 2024-02-13 | End: 2025-01-24

## 2024-06-19 ASSESSMENT — PAIN SCALES - GENERAL: PAINLEVEL: NO PAIN (0)

## 2024-06-19 NOTE — LETTER
6/19/2024       RE: Vinnie Jean Baptiste  [address]  [place] [address]     Dear Colleague,    Thank you for referring your patient, Vinnie Jean Baptiste, to the [facility] UROLOGY [facility] [place] at [facility]. Please see a copy of my visit note below.    Virtual Visit Details    Type of service:  Video Visit   Video Start Time:  8;15 AM  Video End Time: 8:35 AM    Originating Location (pt. Location): Home    Distant Location (provider location):  On-site  Platform used for Video Visit: [facility]    Urology Oncology Clinic   Renal mass             Chief Complaint:   Left renal lesion           Consult or Referral:     Vinnie Jean Baptiste is a 34 year old male seen in consultation.         History of Present Illness:    Vinnie Jean Baptiste is a very pleasant 34 year old male with history of ESRD due to IgA nephropathy currently dialysis was on active transplant list who presents with a history of a renal lesion/mass.  This lesion was initially reported in 2021 and measured about 1 cm at that time.  Recently during the transplant workup, the renal ultrasound again showed the presence of the lesion and then MR renal mass was obtained recently which showed an enhancing 1.8 cm lesion at the anterior portion of the upper pole of left kidney.  There is no evidence of retroperitoneal lymphadenopathy or distant metastasis.  He was then referred to me for further evaluation and recommendation.     he does not have a history of previous abdominal or retroperitoneal surgery.  There is not a family history of renal cell cancer.  He has a history of smoking for 10 years and has been exposed for a long time since his father he is a smoker.    ECOG Performance Score: 0 - Independent           Past Medical History:     Past Medical History:   Diagnosis Date    Anemia in chronic kidney disease (CKD)     CKD (chronic kidney disease) stage 5, GFR less than 15 ml/min (H)     Duodenal ulcer     End stage  renal disease (H)     H. pylori infection     Hematuria     History of blood transfusion     Hypertension     Hypocalcemia     IgA nephropathy     Metabolic acidosis     S/P parathyroidectomy     Secondary renal hyperparathyroidism (H24)             Past Surgical History:     Past Surgical History:   Procedure Laterality Date    BIOPSY      Kidney Biopsy [facility] >10 Years Ago             Problem List:     Patient Active Problem List    Diagnosis Date Noted    S/P parathyroidectomy      Priority: Medium    Hypocalcemia      Priority: Medium    Metabolic acidosis 03/07/2023     Priority: Medium    End stage kidney disease (H)      Priority: Medium    Anemia in chronic kidney disease (CKD)      Priority: Medium    Hypertension      Priority: Medium    IgA nephropathy      Priority: Medium            Medications     Current Outpatient Medications   Medication Sig Dispense Refill    acetaminophen (TYLENOL) 325 MG tablet Take 650 mg by mouth      allopurinol (ZYLOPRIM) 100 MG tablet Take 100 mg by mouth      amLODIPine (NORVASC) 5 MG tablet       calcitRIOL (ROCALTROL) 0.25 MCG capsule       CALCIUM ANTACID 500 MG chewable tablet Take 2,500 mg by mouth 4 times daily      ferrous gluconate (FERGON) 324 (38 Fe) MG tablet TAKE 1 TABLET BY MOUTH TWICE DAILY WITH FOOD      metoprolol tartrate (LOPRESSOR) 50 MG tablet Take 50 mg by mouth 2 times daily      Vitamin D3 50 mcg (2000 units) tablet Take 1 tablet by mouth      metoprolol tartrate (LOPRESSOR) 25 MG tablet  (Patient not taking: Reported on 3/7/2023)      sodium bicarbonate 650 MG tablet  (Patient not taking: Reported on 3/7/2023)      VELPHORO 500 MG CHEW chewable tablet        No current facility-administered medications for this visit.            Family History:     Family History   Problem Relation Age of Onset    Kidney Disease Mother             Social History:     Social History     Socioeconomic History    Marital status:      Spouse name: Not  on file    Number of children: Not on file    Years of education: Not on file    Highest education level: Not on file   Occupational History    Not on file   Tobacco Use    Smoking status: Former     Types: Cigarettes    Smokeless tobacco: Never    Tobacco comments:     Quit Smoking 5/5/2019   Substance and Sexual Activity    Alcohol use: Not Currently    Drug use: Not Currently    Sexual activity: Not on file   Other Topics Concern    Parent/sibling w/ CABG, MI or angioplasty before 65F 55M? Not Asked   Social History Narrative    Not on file     Social Determinants of Health     Financial Resource Strain: Patient Declined (1/31/2024)    [facility]    Overall Financial Resource Strain (CARDIA)     Difficulty of Paying Living Expenses: Patient declined   Food Insecurity: No Food Insecurity (2/15/2024)    [facility]    Hunger Vital Sign     Worried About Running Out of Food in the Last Year: Never true     Ran Out of Food in the Last Year: Never true   Transportation Needs: No Transportation Needs (2/15/2024)    Received from [facility]    Transportation Needs     In the past 12 months, has lack of transportation kept you from medical appointments, meetings, work, or from getting medicines or things needed for daily living?: No   Physical Activity: Sufficiently Active (1/31/2024)    Received from [facility]    Exercise Vital Sign     Days of Exercise per Week: 5 days     Minutes of Exercise per Session: 150+ min   Stress: No Stress Concern Present (1/31/2024)    Received from [facility]    [employer]ational [employer] - Occupational Stress Questionnaire     Feeling of Stress : Not at all   Social Connections: Socially Isolated  (1/31/2024)    Received from [facility] [employer] [facility] [employer]    Social Connection and Isolation Panel [NHANES]     Frequency of Communication with Friends and Family: Three times a week     Frequency of Social Gatherings with Friends and Family: Once a week     Attends Pentecostalism Services: Never     Active Member of Clubs or Organizations: No     Attends Club or Organization Meetings: Never     Marital Status:    Interpersonal Safety: Not At Risk (2/15/2024)    Received from [facility] [employer] [facility], [facility]    Intimate Partner Violence     Are you in a relationship where you are physically hurt, threatened and/or made to feel afraid?: No   Housing Stability: Unknown (2/15/2024)    Received from [facility] [employer] [facility], [facility]    Housing Stability Vital Sign     Unable to Pay for Housing in the Last Year: No     Number of Times Moved in the Last Year: Not on file     Homeless in the Last Year: Not on file            Allergies:   Blood transfusion related (informational only) and Iron dextran         Review of Systems:  From intake questionnaire     Negative 14 system review except as noted on HPI, nurse's note see below.         Physical Exam:     Vitals:  No vitals were obtained today due to virtual visit.    Physical Exam   EYES: Eyes grossly normal to inspection.  No discharge or erythema, or obvious scleral/conjunctival abnormalities.  SKIN: Visible skin clear. No significant rash, abnormal pigmentation or lesions.  NEURO: Cranial nerves grossly intact.  Mentation and speech appropriate for age.  GENERAL: Healthy, alert and no distress  RESP: No audible wheeze, cough, or visible cyanosis.  No visible retractions or increased work of breathing.    PSYCH: Mentation appears normal, affect normal/bright, judgement and insight intact, normal speech and appearance well-groomed.            Labs and Pathology:    I personally reviewed all applicable laboratory and pathology data  "reviewed with patient    Lab Results   Component Value Date    WBC 11.5 09/11/2019     Lab Results   Component Value Date    RBC 4.06 09/11/2019     Lab Results   Component Value Date    HGB 11.6 09/11/2019     Lab Results   Component Value Date    HCT 34.7 09/11/2019     No components found for: \"MCT\"  Lab Results   Component Value Date    MCV 86 09/11/2019     Lab Results   Component Value Date    MCH 28.6 09/11/2019     Lab Results   Component Value Date    MCHC 33.4 09/11/2019     Lab Results   Component Value Date    RDW 13.7 09/11/2019     Lab Results   Component Value Date     09/11/2019       Last Comprehensive Metabolic Panel:  Sodium   Date Value Ref Range Status   09/11/2019 136 133 - 144 mmol/L Final     Potassium   Date Value Ref Range Status   09/11/2019 4.3 3.4 - 5.3 mmol/L Final     Chloride   Date Value Ref Range Status   09/11/2019 105 94 - 109 mmol/L Final     Chloride (External)   Date Value Ref Range Status   05/05/2020 104 98 - 107 mmol/L Final     Carbon Dioxide   Date Value Ref Range Status   09/11/2019 20 20 - 32 mmol/L Final     Anion Gap   Date Value Ref Range Status   09/11/2019 11 3 - 14 mmol/L Final     Glucose   Date Value Ref Range Status   09/11/2019 116 (H) 70 - 99 mg/dL Final     Urea Nitrogen   Date Value Ref Range Status   09/11/2019 78 (H) 7 - 30 mg/dL Final     Creatinine   Date Value Ref Range Status   09/11/2019 4.79 (H) 0.66 - 1.25 mg/dL Final     GFR Estimate   Date Value Ref Range Status   09/11/2019 15 (L) >60 mL/min/[1.73_m2] Final     Comment:     Non  GFR Calc  Starting 12/18/2018, serum creatinine based estimated GFR (eGFR) will be   calculated using the Chronic Kidney Disease Epidemiology Collaboration   (CKD-EPI) equation.       Calcium   Date Value Ref Range Status   09/11/2019 9.2 8.5 - 10.1 mg/dL Final     Bilirubin Total   Date Value Ref Range Status   09/11/2019 0.3 0.2 - 1.3 mg/dL Final     Alkaline Phosphatase   Date Value Ref Range " "Status   09/11/2019 123 40 - 150 U/L Final     ALT   Date Value Ref Range Status   09/11/2019 30 0 - 70 U/L Final     AST   Date Value Ref Range Status   09/11/2019 7 0 - 45 U/L Final         INR   Date Value Ref Range Status   09/11/2019 1.07 0.86 - 1.14 Final        No components found for: \"URINE\"              Imaging:    I personally viewed all applicable imaging and interpreted them and went over the results with the patient.             Assessment and Plan:     Assessment:  34-year-old male with an enhancing 1.8 cm left renal lesion with a growth of about 8 mm in the past 3 years      We discussed the nature of this tumor and differential diagnoses.  He understands that there is 70 to 80% risk of kidney cancer associated with this lesion.  We discussed different management options for small renal masses less than 3 cm.  Given the fact that this mass has been present for at least 3 years with a growth of only 8 mm (less than 3 mm a year), I believe active surveillance would be a very reasonable option.  He is agreeable with the plan.  I will communicate the plan with transplant team.            Plan:  Active surveillance with follow-up in a year with MR renal prior  No clear contraindication for transplant surgery from the standpoint of the kidney lesion      45 total minutes spent on the date of the encounter including direct interaction with the patient, performing chart review, documentation and further activities as noted above      Henny Abraham MD   Department of Urology   [facility]   "

## 2024-06-19 NOTE — PROGRESS NOTES
Virtual Visit Details    Type of service:  Video Visit   Video Start Time:  8;15 AM  Video End Time: 8:35 AM    Originating Location (pt. Location): Home    Distant Location (provider location):  On-site  Platform used for Video Visit: [facility]    Urology Oncology Clinic   Renal mass             Chief Complaint:   Left renal lesion           Consult or Referral:     Vinnie Jean Baptiste is a 34 year old male seen in consultation.         History of Present Illness:    Vinnie Jean Baptiste is a very pleasant 34 year old male with history of ESRD due to IgA nephropathy currently dialysis was on active transplant list who presents with a history of a renal lesion/mass.  This lesion was initially reported in 2021 and measured about 1 cm at that time.  Recently during the transplant workup, the renal ultrasound again showed the presence of the lesion and then MR renal mass was obtained recently which showed an enhancing 1.8 cm lesion at the anterior portion of the upper pole of left kidney.  There is no evidence of retroperitoneal lymphadenopathy or distant metastasis.  He was then referred to me for further evaluation and recommendation.     he does not have a history of previous abdominal or retroperitoneal surgery.  There is not a family history of renal cell cancer.  He has a history of smoking for 10 years and has been exposed for a long time since his father he is a smoker.    ECOG Performance Score: 0 - Independent           Past Medical History:     Past Medical History:   Diagnosis Date    Anemia in chronic kidney disease (CKD)     CKD (chronic kidney disease) stage 5, GFR less than 15 ml/min (H)     Duodenal ulcer     End stage renal disease (H)     H. pylori infection     Hematuria     History of blood transfusion     Hypertension     Hypocalcemia     IgA nephropathy     Metabolic acidosis     S/P parathyroidectomy     Secondary renal hyperparathyroidism (H24)             Past Surgical History:     Past Surgical History:    Procedure Laterality Date    BIOPSY      Kidney Biopsy [facility] >10 Years Ago             Problem List:     Patient Active Problem List    Diagnosis Date Noted    S/P parathyroidectomy      Priority: Medium    Hypocalcemia      Priority: Medium    Metabolic acidosis 03/07/2023     Priority: Medium    End stage kidney disease (H)      Priority: Medium    Anemia in chronic kidney disease (CKD)      Priority: Medium    Hypertension      Priority: Medium    IgA nephropathy      Priority: Medium            Medications     Current Outpatient Medications   Medication Sig Dispense Refill    acetaminophen (TYLENOL) 325 MG tablet Take 650 mg by mouth      allopurinol (ZYLOPRIM) 100 MG tablet Take 100 mg by mouth      amLODIPine (NORVASC) 5 MG tablet       calcitRIOL (ROCALTROL) 0.25 MCG capsule       CALCIUM ANTACID 500 MG chewable tablet Take 2,500 mg by mouth 4 times daily      ferrous gluconate (FERGON) 324 (38 Fe) MG tablet TAKE 1 TABLET BY MOUTH TWICE DAILY WITH FOOD      metoprolol tartrate (LOPRESSOR) 50 MG tablet Take 50 mg by mouth 2 times daily      Vitamin D3 50 mcg (2000 units) tablet Take 1 tablet by mouth      metoprolol tartrate (LOPRESSOR) 25 MG tablet  (Patient not taking: Reported on 3/7/2023)      sodium bicarbonate 650 MG tablet  (Patient not taking: Reported on 3/7/2023)      VELPHORO 500 MG CHEW chewable tablet        No current facility-administered medications for this visit.            Family History:     Family History   Problem Relation Age of Onset    Kidney Disease Mother             Social History:     Social History     Socioeconomic History    Marital status:      Spouse name: Not on file    Number of children: Not on file    Years of education: Not on file    Highest education level: Not on file   Occupational History    Not on file   Tobacco Use    Smoking status: Former     Types: Cigarettes    Smokeless tobacco: Never    Tobacco comments:     Quit Smoking 5/5/2019    Substance and Sexual Activity    Alcohol use: Not Currently    Drug use: Not Currently    Sexual activity: Not on file   Other Topics Concern    Parent/sibling w/ CABG, MI or angioplasty before 65F 55M? Not Asked   Social History Narrative    Not on file     Social Determinants of Health     Financial Resource Strain: Patient Declined (1/31/2024)    [facility]    Overall Financial Resource Strain (CARDIA)     Difficulty of Paying Living Expenses: Patient declined   Food Insecurity: No Food Insecurity (2/15/2024)    [facility]    Hunger Vital Sign     Worried About Running Out of Food in the Last Year: Never true     Ran Out of Food in the Last Year: Never true   Transportation Needs: No Transportation Needs (2/15/2024)    Received from [facility]    Transportation Needs     In the past 12 months, has lack of transportation kept you from medical appointments, meetings, work, or from getting medicines or things needed for daily living?: No   Physical Activity: Sufficiently Active (1/31/2024)    [facility]    Exercise Vital Sign     Days of Exercise per Week: 5 days     Minutes of Exercise per Session: 150+ min   Stress: No Stress Concern Present (1/31/2024)    [facility]    [employer]izen of [place] of Occupational Health - Occupational Stress Questionnaire     Feeling of Stress : Not at all   Social Connections: Socially Isolated (1/31/2024)    [facility]    Social Connection and Isolation Panel [NHANES]     Frequency of Communication with Friends and Family: Three times a week     Frequency of Social Gatherings with Friends and Family: Once a week      Attends Islam Services: Never     Active Member of Clubs or Organizations: No     Attends Club or Organization Meetings: Never     Marital Status:    Interpersonal Safety: Not At Risk (2/15/2024)    Received from [employer] [facility]    Intimate Partner Violence     Are you in a relationship where you are physically hurt, threatened and/or made to feel afraid?: No   Housing Stability: Unknown (2/15/2024)    Received from [employer] [facility] [employer] [facility]    Housing Stability Vital Sign     Unable to Pay for Housing in the Last Year: No     Number of Times Moved in the Last Year: Not on file     Homeless in the Last Year: Not on file            Allergies:   Blood transfusion related (informational only) and Iron dextran         Review of Systems:  From intake questionnaire     Negative 14 system review except as noted on HPI, nurse's note see below.         Physical Exam:     Vitals:  No vitals were obtained today due to virtual visit.    Physical Exam   EYES: Eyes grossly normal to inspection.  No discharge or erythema, or obvious scleral/conjunctival abnormalities.  SKIN: Visible skin clear. No significant rash, abnormal pigmentation or lesions.  NEURO: Cranial nerves grossly intact.  Mentation and speech appropriate for age.  GENERAL: Healthy, alert and no distress  RESP: No audible wheeze, cough, or visible cyanosis.  No visible retractions or increased work of breathing.    PSYCH: Mentation appears normal, affect normal/bright, judgement and insight intact, normal speech and appearance well-groomed.            Labs and Pathology:    I personally reviewed all applicable laboratory and pathology data reviewed with patient    Lab Results   Component Value Date    WBC 11.5 09/11/2019     Lab Results   Component Value Date    RBC 4.06 09/11/2019     Lab Results   Component Value Date    HGB 11.6 09/11/2019     Lab Results   Component Value Date    HCT 34.7 09/11/2019     No components found  "for: \"MCT\"  Lab Results   Component Value Date    MCV 86 09/11/2019     Lab Results   Component Value Date    MCH 28.6 09/11/2019     Lab Results   Component Value Date    MCHC 33.4 09/11/2019     Lab Results   Component Value Date    RDW 13.7 09/11/2019     Lab Results   Component Value Date     09/11/2019       Last Comprehensive Metabolic Panel:  Sodium   Date Value Ref Range Status   09/11/2019 136 133 - 144 mmol/L Final     Potassium   Date Value Ref Range Status   09/11/2019 4.3 3.4 - 5.3 mmol/L Final     Chloride   Date Value Ref Range Status   09/11/2019 105 94 - 109 mmol/L Final     Chloride (External)   Date Value Ref Range Status   05/05/2020 104 98 - 107 mmol/L Final     Carbon Dioxide   Date Value Ref Range Status   09/11/2019 20 20 - 32 mmol/L Final     Anion Gap   Date Value Ref Range Status   09/11/2019 11 3 - 14 mmol/L Final     Glucose   Date Value Ref Range Status   09/11/2019 116 (H) 70 - 99 mg/dL Final     Urea Nitrogen   Date Value Ref Range Status   09/11/2019 78 (H) 7 - 30 mg/dL Final     Creatinine   Date Value Ref Range Status   09/11/2019 4.79 (H) 0.66 - 1.25 mg/dL Final     GFR Estimate   Date Value Ref Range Status   09/11/2019 15 (L) >60 mL/min/[1.73_m2] Final     Comment:     Non  GFR Calc  Starting 12/18/2018, serum creatinine based estimated GFR (eGFR) will be   calculated using the Chronic Kidney Disease Epidemiology Collaboration   (CKD-EPI) equation.       Calcium   Date Value Ref Range Status   09/11/2019 9.2 8.5 - 10.1 mg/dL Final     Bilirubin Total   Date Value Ref Range Status   09/11/2019 0.3 0.2 - 1.3 mg/dL Final     Alkaline Phosphatase   Date Value Ref Range Status   09/11/2019 123 40 - 150 U/L Final     ALT   Date Value Ref Range Status   09/11/2019 30 0 - 70 U/L Final     AST   Date Value Ref Range Status   09/11/2019 7 0 - 45 U/L Final         INR   Date Value Ref Range Status   09/11/2019 1.07 0.86 - 1.14 Final        No components found for: " "\"URINE\"              Imaging:    I personally viewed all applicable imaging and interpreted them and went over the results with the patient.             Assessment and Plan:     Assessment:  34-year-old male with an enhancing 1.8 cm left renal lesion with a growth of about 8 mm in the past 3 years      We discussed the nature of this tumor and differential diagnoses.  He understands that there is 70 to 80% risk of kidney cancer associated with this lesion.  We discussed different management options for small renal masses less than 3 cm.  Given the fact that this mass has been present for at least 3 years with a growth of only 8 mm (less than 3 mm a year), I believe active surveillance would be a very reasonable option.  He is agreeable with the plan.  I will communicate the plan with transplant team.            Plan:  Active surveillance with follow-up in a year with MR renal prior  No clear contraindication for transplant surgery from the standpoint of the kidney lesion      45 total minutes spent on the date of the encounter including direct interaction with the patient, performing chart review, documentation and further activities as noted above      Henny Abraham MD   Department of Urology   [facility]     "

## 2024-06-19 NOTE — NURSING NOTE
Pt is having difficulties joining [employer] with picture and sound. Can you please send pt a [employer] link to his mobile [phone]?     Is the patient currently in the state of [place]? YES    Visit mode:VIDEO    If the visit is dropped, the patient can be reconnected by: VIDEO VISIT: Text to cell phone:   Telephone Information:   Mobile [phone]       Will anyone else be joining the visit? NO  (If patient encounters technical issues they should call [phone]    How would you like to obtain your AVS? MyChart    Are changes needed to the allergy or medication list? No    Are refills needed on medications prescribed by this physician? NO    Reason for visit: Consult (NEW KIDNEY MASS)    Tashia MIRANDA

## 2024-06-28 DIAGNOSIS — Z76.82 ORGAN TRANSPLANT CANDIDATE: Primary | ICD-10-CM

## 2024-06-28 PROCEDURE — 999N001093 HLA VIRTUAL CROSSMATCH (VXM), LIVING DONOR: Performed by: INTERNAL MEDICINE

## 2024-07-09 ENCOUNTER — DOCUMENTATION ONLY (OUTPATIENT)
Dept: TRANSPLANT | Facility: CLINIC | Age: 34
End: 2024-07-09
Payer: COMMERCIAL

## 2024-07-09 DIAGNOSIS — N18.6 ESRD (END STAGE RENAL DISEASE) (H): Primary | ICD-10-CM

## 2024-07-09 DIAGNOSIS — Z76.82 ORGAN TRANSPLANT CANDIDATE: ICD-10-CM

## 2024-07-10 ENCOUNTER — TELEPHONE (OUTPATIENT)
Dept: TRANSPLANT | Facility: CLINIC | Age: 34
End: 2024-07-10
Payer: COMMERCIAL

## 2024-07-10 ENCOUNTER — COMMITTEE REVIEW (OUTPATIENT)
Dept: TRANSPLANT | Facility: CLINIC | Age: 34
End: 2024-07-10
Payer: COMMERCIAL

## 2024-07-10 DIAGNOSIS — Z76.82 ORGAN TRANSPLANT CANDIDATE: ICD-10-CM

## 2024-07-10 DIAGNOSIS — N18.6 ESRD (END STAGE RENAL DISEASE) (H): Primary | ICD-10-CM

## 2024-07-10 NOTE — TELEPHONE ENCOUNTER
Called pt to discuss committee decision. Pt will need intervention on kidney lesion prior to active status consideration. Also reviewed pt is due for A2 titers next month, orders placed, pt will arrange lab appt at [facility]. Pt verbalized understanding of information and has no further questions. Encouraged to reach out if questions arise.

## 2024-07-10 NOTE — COMMITTEE REVIEW
Kidney/Pancreas Committee Review Note     Evaluation Date: 9/11/2019  Committee Review Date: 7/10/2024    Organ being evaluated for: Kidney    Transplant Phase: Waitlist  Transplant Status: Inactive    Transplant Coordinator: Shell Luong  Transplant Surgeon:        Referring Physician: Joel Rivera    Primary Diagnosis: IgA Nephropathy  Secondary Diagnosis:     Committee Review Members:  Nephrology Sara Baires MD   Nutrition Deborah Sosa, RD   Pharmacist Nikia Hernandez, [facility]    - Clinical Urmila Mcgraw, [facility], Gregory Phillips, MSW, Amber Lacey, [facility]   Transplant Shantel Christopher, DELMIS, Libby Jaramillo, NP, Valerie Lenz, RN, Jaz Lancaster, RN, Shell Luong, RN, Madai Romero, SUSANA, Madai Molina, RN, Billie Eng, RN, Stormy Eisenberg, RN, Yamileth Harrell, RN   Transplant Surgery Nikolai Mosqueda MD       Transplant Eligibility: Irreversible chronic kidney disease treated w/dialysis or expected need for dialysis    Committee Review Decision: Remain Inactive    Relative Contraindications:     Absolute Contraindications:      Committee Chair Nikolai Mosqueda MD verbally attested to the committee's decision.    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      Pt was made inactive 12/2022 due to not contacting transplant center. Then had some medical issues needing to be addressed see below since.      CT a/p updated and found to have indeterminate mass. Renal US completed on 5/28/24 showed a solid mass on the left kidney suspicious for malignancy. Seen by urology (Dr. Abraham) on 6/19/24. Tumor has a 70-80% risk of kidney cancer. Since it is a small mass (less then 3cm) and has had only an 8mm growth in the last 3 years, urology felt surveillance is reasonable and cleared pt for transplant.      Cards: Echo and EKG from 2019. DSE completed on 5/16/24, Near maximal stress echo with no ischemia, 80% of age predicted HR achieved.  Reviewed with Sabi.      Non compliance/ poor communication: Made inactive due to not contacting transplant center. HD center has some concerns with compliance with Calcium supplements. Has been answering coordinators calls recently. Updated SW visit, no concerns noted, did have compliance discussion      s/p parathyroidectomy on 2/2/24 through [facility]. C/b hungry bone syndrome. Increased calcium supplements currently managed by primary neph. Ca levels have been 7-8.      Health maintenance: too young for cancer screens      Committee determined that pt will need treatment of renal lesion (either ablation or nephrectomy) prior to active status consideration due to cancer risk with immunosuppression.

## 2024-07-15 PROCEDURE — 86833 HLA CLASS II HIGH DEFIN QUAL: CPT | Performed by: SURGERY

## 2024-07-15 PROCEDURE — 86832 HLA CLASS I HIGH DEFIN QUAL: CPT | Performed by: SURGERY

## 2024-07-15 PROCEDURE — 86833 HLA CLASS II HIGH DEFIN QUAL: CPT | Performed by: TRANSPLANT SURGERY

## 2024-07-16 ENCOUNTER — TELEPHONE (OUTPATIENT)
Dept: TRANSPLANT | Facility: CLINIC | Age: 34
End: 2024-07-16
Payer: COMMERCIAL

## 2024-07-18 ENCOUNTER — TELEPHONE (OUTPATIENT)
Dept: TRANSPLANT | Facility: CLINIC | Age: 34
End: 2024-07-18
Payer: COMMERCIAL

## 2024-07-18 NOTE — TELEPHONE ENCOUNTER
Called pt to discuss urology provider attempting to reach out. Left VM with direct line for return call.     Called dialysis center to discuss pt not answering calls from urology provider. Would like to verify number and get times pt can chat with provider. Left message with direct line for return call from pt.

## 2024-07-23 LAB
PROTOCOL CUTOFF: NORMAL
SA 1  COMMENTS: NORMAL
SA 1 CELL: NORMAL
SA 1 TEST METHOD: NORMAL
SA 2 CELL: NORMAL
SA 2 COMMENTS: NORMAL
SA 2 TEST METHOD: NORMAL
SA1 HI RISK ABY: NORMAL
SA1 MOD RISK ABY: NORMAL
SA2 HI RISK ABY: NORMAL
SA2 MOD RISK ABY: NORMAL
UNACCEPTABLE ANTIGENS: NORMAL
UNOS CPRA: 80

## 2024-07-24 LAB
COMMENT VXMB1: NORMAL
COMMENT VXMB2: NORMAL
COMMENT VXMT1: NORMAL
COMMENT VXMT2: NORMAL
CROSSMATCHDATEVXM: NORMAL
DONOR VXM: NORMAL
DSA VXM B1: NORMAL
DSA VXM B2: NORMAL
DSA VXMT1: NORMAL
DSA VXMT2: NORMAL
RESULT VXM B1: NORMAL
RESULT VXM B2: NORMAL
RESULT VXM T1: NORMAL
RESULT VXM T2: NORMAL
SERUM DATE VXM B1: NORMAL
SERUM DATE VXM B2: NORMAL
SERUM DATE VXM T1: NORMAL
SERUM DATE VXM T2: NORMAL

## 2024-07-31 ENCOUNTER — DOCUMENTATION ONLY (OUTPATIENT)
Dept: TRANSPLANT | Facility: CLINIC | Age: 34
End: 2024-07-31
Payer: COMMERCIAL

## 2024-08-01 ENCOUNTER — TELEPHONE (OUTPATIENT)
Dept: TRANSPLANT | Facility: CLINIC | Age: 34
End: 2024-08-01
Payer: COMMERCIAL

## 2024-08-01 NOTE — TELEPHONE ENCOUNTER
Called pt to check when he is available for call from urologist. Reports he can talk on Tuesdays or Thursdays after 2:30PM. Updated pt to make lab appt at  lab for A2 titers. Pt verbalized understanding of information and has no further questions. Encouraged to reach out if questions arise.

## 2024-08-02 DIAGNOSIS — C64.2 MALIGNANT NEOPLASM OF KIDNEY EXCLUDING RENAL PELVIS, LEFT (H): Primary | ICD-10-CM

## 2024-08-02 RX ORDER — CEFAZOLIN SODIUM 2 G/50ML
2 SOLUTION INTRAVENOUS SEE ADMIN INSTRUCTIONS
OUTPATIENT
Start: 2024-08-02

## 2024-08-02 RX ORDER — CEFAZOLIN SODIUM 2 G/50ML
2 SOLUTION INTRAVENOUS
OUTPATIENT
Start: 2024-08-02

## 2024-08-02 RX ORDER — HEPARIN SODIUM 5000 [USP'U]/.5ML
5000 INJECTION, SOLUTION INTRAVENOUS; SUBCUTANEOUS
OUTPATIENT
Start: 2024-08-02

## 2024-08-08 ENCOUNTER — TELEPHONE (OUTPATIENT)
Dept: UROLOGY | Facility: CLINIC | Age: 34
End: 2024-08-08
Payer: COMMERCIAL

## 2024-08-08 NOTE — TELEPHONE ENCOUNTER
Called patient to discuss surgery scheduling with Dr. Brewer. Spoke with patient and scheduled surgery with Dr. Abraham for 10/8/24 at [place].     H&P scheduled with PAC for 9/17/24. Patient is aware they will get their arrival time for surgery at PAC appointment.    Post-op scheduled for 10/16/24 at 3:30 PM    Writer will mail surgery packet via [employer] on 8/824.    Patient is wondering if he will need more lab work because he is a transplant patient. Message sent to clinic to assist patient with questions.    Patient has writers call back number [phone].    Katelyn George on 8/8/2024 at 3:10 PM

## 2024-08-09 DIAGNOSIS — C64.2 MALIGNANT NEOPLASM OF KIDNEY EXCLUDING RENAL PELVIS, LEFT (H): Primary | ICD-10-CM

## 2024-08-09 NOTE — TELEPHONE ENCOUNTER
FUTURE VISIT INFORMATION      SURGERY INFORMATION:  Date: 10/8/24  Location: uu or  Surgeon:  Henny Abraham MD   Anesthesia Type:  general  Procedure: NEPHRECTOMY, ROBOT-ASSISTED     RECORDS REQUESTED FROM:       Primary Care Provider: Madai Fitzgerald APRN,CNP  - [facility]     Pertinent Medical History: hypertension    Most recent EKG+ Tracin/15/24- [facility]     Most recent ECHO: 22- [facility]     Most recent Cardiac Stress Test: 24

## 2024-08-13 ENCOUNTER — TELEPHONE (OUTPATIENT)
Dept: TRANSPLANT | Facility: CLINIC | Age: 34
End: 2024-08-13
Payer: COMMERCIAL

## 2024-08-13 NOTE — TELEPHONE ENCOUNTER
Called pt to discuss eplet testing and A2 titers. Left VM with direct line for return call.     Called pt to discuss eplet testing and A2 titers. Provided central scheduling number for local [facility] lab. Pt is interested in eplet testing, aware to watch email for docusign consent.

## 2024-09-05 ENCOUNTER — LAB REQUISITION (OUTPATIENT)
Dept: LAB | Facility: CLINIC | Age: 34
End: 2024-09-05
Payer: COMMERCIAL

## 2024-09-05 PROCEDURE — 86828 HLA CLASS I&II ANTIBODY QUAL: CPT | Mod: 59 | Performed by: TRANSPLANT SURGERY

## 2024-09-05 PROCEDURE — 86828 HLA CLASS I&II ANTIBODY QUAL: CPT | Performed by: TRANSPLANT SURGERY

## 2024-09-11 ENCOUNTER — TELEPHONE (OUTPATIENT)
Dept: TRANSPLANT | Facility: CLINIC | Age: 34
End: 2024-09-11
Payer: COMMERCIAL

## 2024-09-11 NOTE — TELEPHONE ENCOUNTER
Email sent to pt requesting lab appt be made for A2 titers.     Called HD center, pt will be coming in in about an hour. They will also remind pt.

## 2024-09-13 LAB
FLOWPRA1 CELL: NORMAL
FLOWPRA1 COMMENTS: NORMAL
FLOWPRA1 RESULT: NORMAL
FLOWPRA1 TEST METHOD: NORMAL
FLOWPRA2 CELL: NORMAL
FLOWPRA2 COMMENTS: NORMAL
FLOWPRA2 RESULT: NORMAL
FLOWPRA2 TEST METHOD: NORMAL

## 2024-09-17 ENCOUNTER — PRE VISIT (OUTPATIENT)
Dept: SURGERY | Facility: CLINIC | Age: 34
End: 2024-09-17

## 2024-09-17 ENCOUNTER — VIRTUAL VISIT (OUTPATIENT)
Dept: SURGERY | Facility: CLINIC | Age: 34
End: 2024-09-17
Payer: COMMERCIAL

## 2024-09-17 VITALS — HEIGHT: 69 IN | WEIGHT: 165 LBS | BODY MASS INDEX: 24.44 KG/M2

## 2024-09-17 DIAGNOSIS — Z01.818 PREOP EXAMINATION: Primary | ICD-10-CM

## 2024-09-17 DIAGNOSIS — C64.2 MALIGNANT NEOPLASM OF LEFT KIDNEY (H): ICD-10-CM

## 2024-09-17 DIAGNOSIS — N02.B9 IGA NEPHROPATHY: ICD-10-CM

## 2024-09-17 PROCEDURE — 99203 OFFICE O/P NEW LOW 30 MIN: CPT | Mod: 95 | Performed by: NURSE PRACTITIONER

## 2024-09-17 ASSESSMENT — PAIN SCALES - GENERAL: PAINLEVEL: NO PAIN (0)

## 2024-09-17 ASSESSMENT — LIFESTYLE VARIABLES: TOBACCO_USE: 1

## 2024-09-17 ASSESSMENT — ENCOUNTER SYMPTOMS
SEIZURES: 1
ORTHOPNEA: 0

## 2024-09-17 NOTE — PROGRESS NOTES
Vinnie is a 34 year old who is being evaluated via a billable video visit.    How would you like to obtain your AVS? E-Mail a copy at [email or web address]  If the video visit is dropped, the invitation should be resent by: Text to cell phone: [phone]    Subjective   Vinnie is a 34 year old, presenting for the following health issues:  Pre-Op Exam      HPI         Physical Exam

## 2024-09-17 NOTE — H&P
Pre-Operative H & P     CC:  Preoperative exam to assess for increased cardiopulmonary risk while undergoing surgery and anesthesia.    Date of Encounter: 9/17/2024  Primary Care Physician:  Clinic, [facility]     Reason for visit:   Encounter Diagnoses   Name Primary?    Preop examination Yes    Malignant neoplasm of left kidney (H)     IgA nephropathy        HPI  Vinnie Jean Baptiste is a 34 year old male who presents for pre-operative H & P in preparation for  Procedure Information       Case: [id number] Date/Time: 10/08/24 0800    Procedure: NEPHRECTOMY, ROBOT-ASSISTED (Bilateral: Abdomen)    Anesthesia type: General    Diagnosis: Malignant neoplasm of kidney excluding renal pelvis, left (H) [C64.2]    Pre-op diagnosis: Malignant neoplasm of kidney excluding renal pelvis, left (H) [C64.2]    Location:  OR  /  OR    Providers: Henny Abraham MD            Vinnie Jean Baptiste is a 34 year old male with hypertension, anemia, thrombocytopenia, gout, s/p parathyroidectomy for secondary renal hyperparathyroidism, IgA nephropathy, ESRD, and dialysis status that has a left renal mass thought to be malignant.  He is currently on dialysis for ESRD secondary to IgA nephropathy and is in the process of being worked up for kidney transplant.  He has a left renal mass that is concerning for malignancy so nephrectomy was recommended prior to pursuing transplant.  He has consulted with Dr. Abraham and will proceed scheduled above.     History is obtained from the patient and chart review    Hx of abnormal bleeding or anti-platelet use: none      Past Medical History  Past Medical History:   Diagnosis Date    Anemia in chronic kidney disease (CKD)     CKD (chronic kidney disease) stage 5, GFR less than 15 ml/min (H)     Duodenal ulcer     End stage renal disease (H)     H. pylori infection     Hematuria     History of blood transfusion     Hypertension     Hypocalcemia     IgA nephropathy     Metabolic acidosis     S/P  parathyroidectomy     Secondary renal hyperparathyroidism (H24)        Past Surgical History  Past Surgical History:   Procedure Laterality Date    AV FISTULA OR GRAFT ARTERIAL Left     BIOPSY      Kidney Biopsy [facility] >10 Years Ago     partial parathyroidectomy  2023       Prior to Admission Medications  Current Outpatient Medications   Medication Sig Dispense Refill    acetaminophen (TYLENOL) 325 MG tablet Take 650 mg by mouth      allopurinol (ZYLOPRIM) 100 MG tablet Take 100 mg by mouth      amLODIPine (NORVASC) 5 MG tablet Take 5 mg by mouth 2 times daily.      CALCIUM ANTACID 500 MG chewable tablet Take 2,500 mg by mouth 4 times daily      metoprolol tartrate (LOPRESSOR) 50 MG tablet Take 100 mg by mouth 2 times daily.      VELPHORO 500 MG CHEW chewable tablet Take 1,000 mg by mouth 3 times daily (with meals).      Vitamin D3 50 mcg (2000 units) tablet Take 1 tablet by mouth every morning.      calcitRIOL (ROCALTROL) 0.25 MCG capsule  (Patient not taking: Reported on 9/17/2024)      ferrous gluconate (FERGON) 324 (38 Fe) MG tablet TAKE 1 TABLET BY MOUTH TWICE DAILY WITH FOOD (Patient not taking: Reported on 9/17/2024)      sodium bicarbonate 650 MG tablet  (Patient not taking: Reported on 3/7/2023)         Allergies  Allergies   Allergen Reactions    Blood Transfusion Related (Informational Only) Other (See Comments)     Patient has a history of a clinically significant antibody against RBC antigens.  A delay in compatible RBCs may occur.    Iron Dextran      IV iron. Vision blurry, difficulty breathing, sweating. 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 Not Asked   Social History Narrative    Not on file     Social Determinants of Health     Financial Resource Strain: Patient Declined (1/31/2024)    [facility]    Overall Financial Resource Strain (CARDIA)     Difficulty of Paying Living Expenses: Patient declined   Food Insecurity: No Food Insecurity (2/15/2024)    [facility]    Hunger Vital Sign     Worried About Running Out of Food in the Last Year: Never true     Ran Out of Food in the Last Year: Never true   Transportation Needs: No Transportation Needs (2/15/2024)    Received from [facility]    Transportation Needs     In the past 12 months, has lack of transportation kept you from medical appointments, meetings, work, or from getting medicines or things needed for daily living?: No   Physical Activity: Sufficiently Active (1/31/2024)    [facility]    Exercise Vital Sign     Days of Exercise per Week: 5 days     Minutes of Exercise per Session: 150+ min   Stress: No Stress Concern Present (1/31/2024)    [facility]    [place] [employer] - Occupational Stress Questionnaire     Feeling of Stress : Not at all   Social Connections: Socially Isolated (1/31/2024)    [facility]    Social Connection and Isolation Panel [NHANES]     Frequency of Communication with Friends and Family: Three times a week     Frequency of Social Gatherings  with Friends and Family: Once a week     Attends Confucianist Services: Never     Active Member of Clubs or Organizations: No     Attends Club or Organization Meetings: Never     Marital Status:    Interpersonal Safety: Not At Risk (2/15/2024)    [facility]    Intimate Partner Violence     Are you in a relationship where you are physically hurt, threatened and/or made to feel afraid?: No   Housing Stability: Unknown (2/15/2024)    Received from [facility]    Housing Stability Vital Sign     Unable to Pay for Housing in the Last Year: No     Number of Times Moved in the Last Year: Not on file     Homeless in the Last Year: Not on file       Family History  Family History   Problem Relation Age of Onset    Diabetes Mother     Kidney Disease Mother     Hyperlipidemia Father     Diabetes Father     Anesthesia Reaction No family hx of     Thrombosis No family hx of        Review of Systems  The complete review of systems is negative other than noted in the HPI or here.   Anesthesia Evaluation   Pt has had prior anesthetic.     No history of anesthetic complications       ROS/MED HX  ENT/Pulmonary:     (+)     SHIRA risk factors, snores loudly, hypertension,         tobacco use, Past use,                    (-) recent URI   Neurologic:     (+)       seizures, last seizure: x1 in 2023 secondary to calcium levels,                        Cardiovascular:     (+)  hypertension- -   -  - -                                 Previous cardiac testing   Echo: Date: 2022 Results:  Summary:    * Left ventricular segmental wall motion is normal.     * The estimated ejection fraction is 55-60%.     * There is aortic valve sclerosis.     * There is trace mitral regurgitation.     * There is trace tricuspid regurgitation.     * The left atrium is severely enlarged.     * The right atrium is enlarged.     * No prior study available for comparison.     Stress Test:  Date: 5/2024  Results:  Stress test  5/2024  Interpretation Summary  Near maximal dobutamine stress echo with no inducible ischemia. 80% of the age  predicted maximal heart rate achieved.     Hypertensive at baseline and in response to dobutamine  No angina symptoms during stress test.  No ECG evidence of ischemia at rest or with dobutamine. Normal global left  ventricular systolic function at baseline, LVEF 55-60%.  With peak dobutamine, LVEF increased further to 65-70% and LV cavity size  decreased appropriately.  No rest or stress-induced regional wall motion abnormalities.     Moderate mitral annular calcification with mild mitral regurgitation and a  moderately increased mean mitral valve gradient of 10 mmHg noted on screening  2D and Doppler examination.  ECG Reviewed:  Date: 2/2024 Results:  Test Reason : ETC medical screening   Vent. Rate : 086 BPM     Atrial Rate : 086 BPM      P-R Int : 156 ms          QRS Dur : 084 ms       QT Int : 426 ms       P-R-T Axes : 064 064 217 degrees      QTc Int : 509 ms     Normal sinus rhythm   Possible Left atrial enlargement   Left ventricular hypertrophy with repolarization abnormality   Prolonged QT   Abnormal ECG     Cath:  Date: Results:   (-) YATES and orthopnea/PND   METS/Exercise Tolerance: >4 METS Comment: Walks and ride his bike occasionally.  Is also active working in a factory.  Denies any exertional dyspnea or angina.    Hematologic:     (+)      anemia, history of blood transfusion, no previous transfusion reaction,        Musculoskeletal:  - neg musculoskeletal ROS     GI/Hepatic:  - neg GI/hepatic ROS     Renal/Genitourinary: Comment: Left AV fistula  Has dialysis MWF  IgA nephropathy    (+) renal disease, type: ESRD, Pt requires dialysis, type: Hemodialysis,          Endo: Comment: Secondary renal hyperparathyroidism - now s/p parathyroidectomy      Psychiatric/Substance Use:  - neg psychiatric ROS     Infectious Disease:  - neg infectious disease ROS     Malignancy:   (+)  Malignancy, History of Other.Other CA left renal mass, likely malignant Active status post.    Other:  - neg other ROS          Virtual visit -  No vitals were obtained    Physical Exam  Constitutional: Awake, alert, cooperative, no apparent distress, and appears stated age.  Eyes: Pupils equal  HENT: Normocephalic  Respiratory: non labored breathing   Neurologic: Awake, alert, oriented to name, place and time.   Neuropsychiatric: Calm, cooperative. Normal affect.      Prior Labs/Diagnostic Studies   All labs and imaging personally reviewed     EKG/ stress test - if available please see in ROS above     Labs:   BASIC METABOLIC PANEL  Order: [id number]   suggestion  Information displayed in this report may not trend or trigger automated decision support.     Component  Ref Range & Units 7 d ago   Sodium  136 - 146 mmol/L 141   Potassium  3.5 - 5.1 mmol/L 4.0   Chloride  98 - 107 mmol/L 99   CO2  22 - 29 mmol/L 25   Anion Gap  10.0 - 20.0 mmol/L 21.0 High    Creatinine  0.72 - 1.25 mg/dL 10.89 High Panic    Blood Urea Nitrogen  10.0 - 20.0 mg/dL 35.8 High    BUN/Creatinine Ratio  11.70 - 22.90 ratio 3.29 Low    Calcium, Total  8.6 - 10.5 mg/dL 10.2   Glucose  70 - 100 mg/dL 95   eGFR  >=60 mL/min/1.73m(2) 6 Low    Comment: Calculation based on the Chronic Kidney Disease Epidemiology Collaboration (CKD-EPI) equation refit without adjustment for race.   Fasting Status No     Specimen Collected: 10/01/24  3:47 PM    Performed by: [facility] - [facility] Last Resulted: 10/01/24  4:55 PM   Received From: [facility]  Result Received: 10/02/24 11:45 AM    View Encounter        Received Information   Contains abnormal data COMPLETE BLOOD COUNT AND DIFFERENTIAL  Order: [id number]   suggestion  Information displayed in this report may not trend or trigger automated decision support.     Component  Ref Range & Units 7 d ago   WBC Count, Corrected  3.9 - 11.9 10(3)/uL 8.3   RBC  Count  4.08 - 5.79 10(6)/uL 3.95 Low    Hemoglobin  13.1 - 17.1 g/dL 11.8 Low    Hematocrit  38.7 - 51.4 % 34.0 Low    MCV  82.9 - 100.6 fL 86.0   MCH  27.6 - 33.2 pg 30.0   MCHC  32.0 - 36.0 g/dL 34.9   RDW  10.0 - 16.2 % 16.7 High    Platelets  179 - 450 10(3)/uL 266   MPV  7.4 - 10.4 fL 7.7   % Neutrophils  43.0 - 80.0 % 66.9   % Lymphocytes  16.0 - 49.0 % 21.1   % Monocytes  0.0 - 10.0 % 9.3   % Eosinophils  0.0 - 7.0 % 0.7   % Basophils  0.0 - 2.0 % 2.0   Abs Neutrophils  1.6 - 8.1 10(3)/uL 5.5   Abs Lymphocytes  0.9 - 3.5 10(3)/uL 1.7   Abs Monocytes  0.0 - 1.1 10(3)/uL 0.8   Abs Eosinophils  0.0 - 0.8 10(3)/uL 0.1   Abs Basophils  0.0 - 0.2 10(3)/uL 0.2     Specimen Collected: 10/01/24  3:47 PM    Performed by: [facility] - [facility] Last Resulted: 10/01/24  4:12 PM         The patient's records and results personally reviewed by this provider.     Outside records reviewed from: Care Everywhere      Assessment    Vinnie Jean Baptiste is a 34 year old male seen as a PAC referral for risk assessment and optimization for anesthesia.    Plan/Recommendations  Pt will be optimized for the proposed procedure.  See below for details on the assessment, risk, and preoperative recommendations    NEUROLOGY  - History of Seizure    -Post Op delirium risk factors:  High co-morbid index    ENT  - No current airway concerns.  Will need to be reassessed day of surgery.  Mallampati: Unable to assess  TM: Unable to assess    CARDIAC  - No history of CAD and Afib  - prior cardiac testing as above.    - METS (Metabolic Equivalents)  Patient performs 4 or more METS exercise without symptoms             Total Score: 0      RCRI-Moderate risk: Class 3  6.6% complication rate             Total Score: 2    RCRI: High Risk Surgery    RCRI: Elevated Creatinine        PULMONARY    SHIRA Medium Risk             Total Score: 3    SHIRA: Snores loudly    SHIRA: Hypertension    SHIRA: Male      - Denies asthma or inhaler use  -  "Tobacco History    History   Smoking Status    Former    Types: Cigarettes   Smokeless Tobacco    Never       GI  - denies GERD  PONV Medium Risk  Total Score: 2           1 AN PONV: Patient is not a current smoker    1 AN PONV: Intended Post Op Opioids        /RENAL  - ESRD secondary to IgA nephropathy.  On dialysis MWF.  Left arm AV fistula.  - left renal mass/malignant neoplasm of kidney.  Surgery planned as above.     ENDOCRINE   - BMI: Estimated body mass index is 24.72 kg/m  as calculated from the following:    Height as of this encounter: 1.74 m (5' 8.5\").    Weight as of this encounter: 74.8 kg (165 lb).  Healthy Weight (BMI 18.5-24.9)  - No history of Diabetes Mellitus    HEME  VTE Medium Risk 1.8%             Total Score: 7    VTE: Male    VTE: Current cancer      - No history of abnormal bleeding or antiplatelet use.  - Chronic anemia  Recommend perioperative use of blood conservation techniques intraoperatively and close monitoring for postoperative bleeding.  A type and screen has been ordered for this patient    MSK  Patient is NOT Frail             Total Score: 0              Different anesthesia methods/types have been discussed with the patient, but they are aware that the final plan will be decided by the assigned anesthesia provider on the date of service.      The patient is optimized for their procedure. AVS with information on surgery time/arrival time, meds and NPO status given by nursing staff. No further diagnostic testing indicated.    Please refer to the physical examination documented by the anesthesiologist in the anesthesia record on the day of surgery.    Video-Visit Details    Type of service:  Video Visit    Provider received verbal consent for a Video Visit from the patient? Yes   Video Start Time:  1516  Video End Time: 1530    Originating Location (pt. Location): Home    Distant Location (provider location):  Off-site  Mode of Communication:  Video Conference via [employer]    On " the day of service:     Prep time: 10 minutes  Visit time: 14 minutes  Documentation time: 16 minutes  ------------------------------------------  Total time: 40 minutes      ERICKA Rebolledo CNP  Preoperative Assessment Center  [facility]  Clinic and Surgery Center  Phone: [phone]  Fax: [phone]

## 2024-09-17 NOTE — PATIENT INSTRUCTIONS
Preparing for Your Surgery      Name:  Vinnie Jean Baptiste   MRN:  [id number]   :  1990   Today's Date:  2024       Arriving for surgery:  Surgery date:  10-8-24  Arrival time:  6 am    Please come to:     [facility] Mobile Unit    [address] [place]  [address]     The [facility] ([facility]) Mobile Patient/Visitor Ramp is at [address]. Patients and visitors who self-park will receive the reduced hospital parking rate. If the Patient /Visitor Ramp is full, please follow the signs to the [employer] car park located at the main hospital entrance.       parking is available (24 hours/ 7 days a week)      Discounted parking pass options are available for patients and visitors. They can be purchased at the D2S desk at the [facility] hospital entrance.     -  Stop at the security desk and they will direct surgery patients to Registration and the Surgery Check in and Family Lounge. [phone]        - If you need directions, a wheelchair or an escort please stop at the Information/security desk in the lobby.     What can I eat or drink?  -  You may eat and drink normally up to 8 hours prior to arrival time. (Until 10 pm 10-7-24)  -  You may have clear liquids until 2 hours prior to arrival time. (Until 4 am)    Examples of clear liquids:  Water  Clear broth  Juices (apple, white grape, white cranberry  and cider) without pulp  Noncarbonated, powder based beverages  (lemonade and Jonathan-Aid)  Sodas (Sprite, 7-Up, ginger ale and seltzer)  Coffee or tea (without milk or cream)  Gatorade    -  No Alcohol or cannabis products for at least 24 hours before surgery.     Which medicines can I take?  Hold Aspirin for 7 days before surgery.   Hold Multivitamins for 7 days before surgery.  Hold Supplements for 7 days before surgery.  Hold Ibuprofen (Advil, Motrin) for 1 day before surgery--unless otherwise directed by  surgeon.  Hold Naproxen (Aleve) for 4 days before surgery.  Acetaminophen (Tylenol) is okay to take if needed.    -  DO NOT take these medications the day of surgery:  Velphoro  Vitamin D3 (Cholecalciferol)  Calcium Antacid      -  PLEASE TAKE these medications the day of surgery:  Allopurinol (Zyloprim)  Amlodipine (Norvasc)  Metoprolol Tartrate (Lopressor)  Acetaminophen (Tylenol) if needed    How do I prepare myself?  - Please take 2 showers (one the night prior to surgery and one the morning of surgery) using Scrubcare or Hibiclens soap.   You may use your own shampoo and conditioner. No other hair products.    Use this soap only from the neck to your toes.   Leave the soap on your skin for one minute--then rinse thoroughly.   - Please remove all jewelry and body piercings.  - No lotions, deodorants or fragrance.  - No makeup or fingernail polish.   - Bring your ID and insurance card.    -If you use a CPAP machine, please bring the CPAP machine, tubing, and mask to hospital.    -If you have a Deep Brain Stimulator, Spinal Cord Stimulator, or any Neuro Stimulator device---you must bring the remote control to the hospital.      ALL PATIENTS GOING HOME THE SAME DAY OF SURGERY ARE REQUIRED TO HAVE A RESPONSIBLE ADULT TO DRIVE AND BE IN ATTENDANCE WITH THEM FOR 24 HOURS FOLLOWING SURGERY.    Covid testing policy as of 12/06/2022  Your surgeon will notify and schedule you for a COVID test if one is needed before surgery--please direct any questions or COVID symptoms to your surgeon      Questions or Concerns:    - For any questions regarding the day of surgery or your hospital stay, please contact the Pre Admission Nursing Office at [phone].       - If you have health changes between today and your surgery, please call your surgeon.       - For questions after surgery, please call your surgeons office.           Current Visitor Guidelines    You may have 2 visitors in the pre op area.    Visiting hours: 8 a.m. to  8:30 p.m.    Patients confirmed or suspected to have symptoms of COVID 19 or flu:     No visitors allowed for adult patients.   Children (under age 18) can have 1 named visitor.     People who are sick or showing symptoms of COVID 19 or flu:    Are not allowed to visit patients--we can only make exceptions in special situations.       Please follow these guidelines for your visit:          Please maintain social distance          Masking is optional--however at times you may be asked to wear a mask for the safety of yourself and others     Clean your hands with alcohol hand . Do this when you arrive at and leave the building and patient room,    And again after you touch your mask or anything in the room.     Go directly to and from the room you are visiting.     Stay in the patient s room during your visit. Limit going to other places in the hospital as much as possible     Leave bags and jackets at home or in the car.     For everyone s health, please don t come and go during your visit. That includes for smoking   during your visit.

## 2024-09-18 ENCOUNTER — LAB REQUISITION (OUTPATIENT)
Dept: LAB | Facility: CLINIC | Age: 34
End: 2024-09-18
Payer: COMMERCIAL

## 2024-09-30 ENCOUNTER — LAB REQUISITION (OUTPATIENT)
Dept: LAB | Facility: CLINIC | Age: 34
End: 2024-09-30
Payer: COMMERCIAL

## 2024-09-30 LAB
PROTOCOL CUTOFF: NORMAL
UNACCEPTABLE ANTIGENS: NORMAL
UNOS CPRA: 60

## 2024-10-01 ENCOUNTER — TELEPHONE (OUTPATIENT)
Dept: UROLOGY | Facility: CLINIC | Age: 34
End: 2024-10-01
Payer: COMMERCIAL

## 2024-10-01 DIAGNOSIS — N28.9 RENAL LESION: Primary | ICD-10-CM

## 2024-10-01 NOTE — TELEPHONE ENCOUNTER
Spoke to pt about completing pre-surgery lab work. Pt said he could go today but needed orders faxed to [facility]. Sent request to Toro Turner RN, BSN  [facility]

## 2024-10-02 ENCOUNTER — PRE VISIT (OUTPATIENT)
Dept: UROLOGY | Facility: CLINIC | Age: 34
End: 2024-10-02
Payer: COMMERCIAL

## 2024-10-02 ENCOUNTER — TELEPHONE (OUTPATIENT)
Dept: TRANSPLANT | Facility: CLINIC | Age: 34
End: 2024-10-02
Payer: COMMERCIAL

## 2024-10-02 NOTE — TELEPHONE ENCOUNTER
I called Vinnie today to review the Kidney Paired Donation Recipient Consent (revision date: 12/1/2015) and the Advanced Donor Voucher Recipient Consent Version 3.3.     I answered questions regarding typical time waiting for a match offer in [facility] and informed him that ultimately waiting time is unknown and it could be 3 to 6 months even with preference in the NKR system as a voucher dietz.       I reviewed details pertaining to the Paired Exchange program: National Kidney Registry (NKR).                                 I discussed with patient the matching procedure and logistics of NKR.  Including that the pair/indiviudal cannot choose their match.  I reviewed our program's policy regarding communication between recipient and their matched donor.  I stated that only if both parties are willing to exchange identities, we can facilitate that, but must require a waiver.  I reviewed that shipping of the kidney is a process that is monitored closely but is not without risk. In the event that the kidney is damaged or lost that the programs managing the exchange try to repair broken chains but there is no guarantee that they would receive a kidney.   I reviewed with the patient that at any time they can withdraw from the KPD program or refuse a match offer.       Vinnie reports he completed self registration for voucher last evening.      I reviewed The Advanced Donation Program as follows:        ADP  allows an acceptable (as determined by the transplant center) Intended Donor  ID  to donate their kidney via human organ paired donation (more commonly referred to as an exchange or a swap) before their Voucher Dietz  VH  receives a transplant. Once the ID s donation has occurred, the VH(s) may be activated by their transplant center for matching by the National Kidney Registry (NKR). I understand that my ID would like to participate in the ADP to donate a kidney to provide a voucher for me. This donation gives one Voucher  Dietz an opportunity to receive a living donor kidney through the Standard Voucher option, in which the Voucher Dietz is likely to need a kidney transplant within 1 year. I consent to the release of my health, medical, and personal information to the NKR for the purpose of participating in the ADP. I authorize the NKR to disclose, share and use my health, medical and personal information in conducting the ADP, and I waive any and all privacy law claims in the use of this information as part of the ADP. I understand that this form must be completed and returned at least 3 weeks before the ID's scheduled surgery date. I am willing to undergo the identity verification process if I receive a kidney through this program. I understand that this Voucher is non-transferable, and non-assignable. The ADP program is unrelated to the [place]  donor system and participation in the ADP program does not give any wait time points for the VH in the  donor system. Should a voucher ever need to be redeemed, the patient is responsible for the transplant related costs.     We reviewed the circumstances which may prevent Vinnie from receiving a kidney:     _ __ A situation whereby I become medically unable to undergo transplant surgery.  _ _ NKR s inability to find an acceptable compatible donor.  _ __ The NKR may unexpectedly shut down.  _ __ Having a blood type of  O  can often delay a match offer for 1 - 2 years or more after activation.  _ __ A sensitization event that increases your NKR cPRA (e.g. blood transfusion, pregnancy, prior transplant, etc.).  _ __ Having an increased NKR cPRA can delay a match offer for 1 - 2 years or more after activation.  _ __ Having an NKR cPRA of ? 99% may result in never finding a match.  _ __ Unforeseen circumstances such as an act of nature.  _ _ Your Transplant Center not accepting potential donors, once you are activated for matching.      Signed voucher paperwork via self  registration     Michael's activation in NKR is pending his donor's advanced donation. He knows how to get a hold of me in the meantime if he has any additional questions/concerns.

## 2024-10-02 NOTE — TELEPHONE ENCOUNTER
Reason for visit: follow up post op     Relevant information: nephrectomy scheduled 10/8/24    Records/imaging/labs/orders: post op labs scheduled 10/16/24 prior to appt    Pt called: No need for a call    At Rooming: bhargavi Avendaño  10/2/2024  2:43 PM

## 2024-10-03 ENCOUNTER — TELEPHONE (OUTPATIENT)
Dept: SURGERY | Facility: CLINIC | Age: 34
End: 2024-10-03
Payer: COMMERCIAL

## 2024-10-03 NOTE — TELEPHONE ENCOUNTER
Received a critical lab value from [facility] that Vinnie's creatinine is 10.89.  Updated Evelina Bell; this RN called the patient twice, left a voice message for a call back.  Will follow up.  Renu Genao RN

## 2024-10-07 ENCOUNTER — TELEPHONE (OUTPATIENT)
Dept: UROLOGY | Facility: CLINIC | Age: 34
End: 2024-10-07
Payer: COMMERCIAL
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
## 2024-10-31 LAB — SCANNED LAB RESULT: NORMAL

## 2024-12-03 ENCOUNTER — TELEPHONE (OUTPATIENT)
Dept: TRANSPLANT | Facility: CLINIC | Age: 34
End: 2024-12-03
Payer: COMMERCIAL

## 2024-12-03 NOTE — TELEPHONE ENCOUNTER
Called pt to touch base as urology has been trying to reach pt to schedule nephrectomy but has not been able to connect with pt. Pt is currently in the hospital as fistula is clotting. He needs a fistulagram. Pt will contact Urology RN once discharged and ready to schedule nephrectomy. Since pt is admitted he would like writer to call his brotherJosr at [phone] to provide urology RN number. Pt verbalized understanding of information and has no further questions. Encouraged to reach out if questions arise.     Called Josr. Provided DELMIS Zepeda number ([phone]) for pt to call when he is discharged. Brother took number and will give to pt when he is discharged.

## 2025-01-06 ENCOUNTER — TELEPHONE (OUTPATIENT)
Dept: TRANSPLANT | Facility: CLINIC | Age: 35
End: 2025-01-06
Payer: MEDICARE

## 2025-01-06 NOTE — TELEPHONE ENCOUNTER
Called pt to check in on nephrectomy scheduling. Pt needs a fistula revision, waiting to hear from surgical team to schedule this. Unsure if pt can proceed with nephrectomy while waiting fistula. He does have a line in place for dialysis. Will message urology team.

## 2025-01-08 NOTE — TELEPHONE ENCOUNTER
Called pt to update. [place] back from Urology team, pt can hold off on nephrectomy until after fistula surgery is completed. Requested pt update writer when he is healed. Left  with direct line for return call.

## 2025-03-04 ENCOUNTER — TELEPHONE (OUTPATIENT)
Dept: TRANSPLANT | Facility: CLINIC | Age: 35
End: 2025-03-04
Payer: MEDICARE

## 2025-03-04 NOTE — TELEPHONE ENCOUNTER
Called pt to check in on scheduling nephrectomy. Follow up on March 11 for follow up for fistula and then he will reach out to urology to schedule the nephrectomy.

## (undated) RX ORDER — HYDRALAZINE HYDROCHLORIDE 20 MG/ML
INJECTION INTRAMUSCULAR; INTRAVENOUS
Status: DISPENSED
Start: 2024-05-16

## (undated) RX ORDER — METOPROLOL TARTRATE 1 MG/ML
INJECTION, SOLUTION INTRAVENOUS
Status: DISPENSED
Start: 2024-05-16

## (undated) RX ORDER — DOBUTAMINE HYDROCHLORIDE 200 MG/100ML
INJECTION INTRAVENOUS
Status: DISPENSED
Start: 2024-05-16

## (undated) RX ORDER — ATROPINE SULFATE 0.4 MG/ML
AMPUL (ML) INJECTION
Status: DISPENSED
Start: 2024-05-16